# Patient Record
Sex: FEMALE | Race: BLACK OR AFRICAN AMERICAN | Employment: UNEMPLOYED | URBAN - METROPOLITAN AREA
[De-identification: names, ages, dates, MRNs, and addresses within clinical notes are randomized per-mention and may not be internally consistent; named-entity substitution may affect disease eponyms.]

---

## 2017-01-12 ENCOUNTER — ED HISTORICAL/CONVERTED ENCOUNTER (OUTPATIENT)
Dept: OTHER | Age: 22
End: 2017-01-12

## 2017-01-13 ENCOUNTER — OP HISTORICAL/CONVERTED ENCOUNTER (OUTPATIENT)
Dept: OTHER | Age: 22
End: 2017-01-13

## 2017-03-17 ENCOUNTER — ED HISTORICAL/CONVERTED ENCOUNTER (OUTPATIENT)
Dept: OTHER | Age: 22
End: 2017-03-17

## 2017-05-19 ENCOUNTER — ED HISTORICAL/CONVERTED ENCOUNTER (OUTPATIENT)
Dept: OTHER | Age: 22
End: 2017-05-19

## 2017-05-23 ENCOUNTER — ED HISTORICAL/CONVERTED ENCOUNTER (OUTPATIENT)
Dept: OTHER | Age: 22
End: 2017-05-23

## 2018-12-05 ENCOUNTER — ED HISTORICAL/CONVERTED ENCOUNTER (OUTPATIENT)
Dept: OTHER | Age: 23
End: 2018-12-05

## 2019-05-13 ENCOUNTER — ED HISTORICAL/CONVERTED ENCOUNTER (OUTPATIENT)
Dept: OTHER | Age: 24
End: 2019-05-13

## 2021-08-12 ENCOUNTER — HOSPITAL ENCOUNTER (EMERGENCY)
Age: 26
Discharge: HOME | End: 2021-08-12
Admitting: EMERGENCY MEDICINE
Payer: COMMERCIAL

## 2021-08-12 VITALS
WEIGHT: 238 LBS | OXYGEN SATURATION: 99 % | TEMPERATURE: 99.4 F | BODY MASS INDEX: 43.79 KG/M2 | SYSTOLIC BLOOD PRESSURE: 137 MMHG | DIASTOLIC BLOOD PRESSURE: 97 MMHG | RESPIRATION RATE: 18 BRPM | HEIGHT: 62 IN | HEART RATE: 91 BPM

## 2021-08-12 DIAGNOSIS — U07.1 COVID-19: Primary | ICD-10-CM

## 2021-08-12 DIAGNOSIS — R11.2 NAUSEA AND VOMITING, INTRACTABILITY OF VOMITING NOT SPECIFIED, UNSPECIFIED VOMITING TYPE: ICD-10-CM

## 2021-08-12 LAB
ALBUMIN SERPL-MCNC: 3.5 G/DL (ref 3.5–5)
ALBUMIN/GLOB SERPL: 0.7 {RATIO} (ref 1.1–2.2)
ALP SERPL-CCNC: 56 U/L (ref 45–117)
ALT SERPL-CCNC: 32 U/L (ref 12–78)
ANION GAP SERPL CALC-SCNC: 4 MMOL/L (ref 5–15)
APPEARANCE UR: ABNORMAL
AST SERPL W P-5'-P-CCNC: 42 U/L (ref 15–37)
BACTERIA URNS QL MICRO: NEGATIVE /HPF
BASOPHILS # BLD: 0 K/UL (ref 0–0.1)
BASOPHILS NFR BLD: 0 % (ref 0–1)
BILIRUB SERPL-MCNC: 0.5 MG/DL (ref 0.2–1)
BILIRUB UR QL: NEGATIVE
BUN SERPL-MCNC: 9 MG/DL (ref 6–20)
BUN/CREAT SERPL: 12 (ref 12–20)
CA-I BLD-MCNC: 8.3 MG/DL (ref 8.5–10.1)
CHLORIDE SERPL-SCNC: 102 MMOL/L (ref 97–108)
CO2 SERPL-SCNC: 25 MMOL/L (ref 21–32)
COLOR UR: YELLOW
CREAT SERPL-MCNC: 0.75 MG/DL (ref 0.55–1.02)
DIFFERENTIAL METHOD BLD: ABNORMAL
EOSINOPHIL # BLD: 0 K/UL (ref 0–0.4)
EOSINOPHIL NFR BLD: 0 % (ref 0–7)
ERYTHROCYTE [DISTWIDTH] IN BLOOD BY AUTOMATED COUNT: 13.6 % (ref 11.5–14.5)
GLOBULIN SER CALC-MCNC: 5 G/DL (ref 2–4)
GLUCOSE SERPL-MCNC: 115 MG/DL (ref 65–100)
GLUCOSE UR STRIP.AUTO-MCNC: NEGATIVE MG/DL
HCT VFR BLD AUTO: 46.8 % (ref 35–47)
HGB BLD-MCNC: 15.4 G/DL (ref 11.5–16)
HGB UR QL STRIP: NEGATIVE
IMM GRANULOCYTES # BLD AUTO: 0 K/UL
IMM GRANULOCYTES NFR BLD AUTO: 0 %
KETONES UR QL STRIP.AUTO: 20 MG/DL
LEUKOCYTE ESTERASE UR QL STRIP.AUTO: NEGATIVE
LYMPHOCYTES # BLD: 2 K/UL (ref 0.8–3.5)
LYMPHOCYTES NFR BLD: 56 % (ref 12–49)
MCH RBC QN AUTO: 28.5 PG (ref 26–34)
MCHC RBC AUTO-ENTMCNC: 32.9 G/DL (ref 30–36.5)
MCV RBC AUTO: 86.7 FL (ref 80–99)
MONOCYTES # BLD: 0.6 K/UL (ref 0–1)
MONOCYTES NFR BLD: 16 % (ref 5–13)
MUCOUS THREADS URNS QL MICRO: ABNORMAL /LPF
NEUTS SEG # BLD: 1 K/UL (ref 1.8–8)
NEUTS SEG NFR BLD: 28 % (ref 32–75)
NITRITE UR QL STRIP.AUTO: NEGATIVE
NRBC # BLD: 0 K/UL (ref 0–0.01)
NRBC BLD-RTO: 0 PER 100 WBC
PH UR STRIP: 5 [PH] (ref 5–8)
PLATELET # BLD AUTO: 95 K/UL (ref 150–400)
POTASSIUM SERPL-SCNC: 5 MMOL/L (ref 3.5–5.1)
PROT SERPL-MCNC: 8.5 G/DL (ref 6.4–8.2)
PROT UR STRIP-MCNC: >300 MG/DL
RBC # BLD AUTO: 5.4 M/UL (ref 3.8–5.2)
RBC #/AREA URNS HPF: ABNORMAL /HPF (ref 0–5)
RBC MORPH BLD: ABNORMAL
SODIUM SERPL-SCNC: 131 MMOL/L (ref 136–145)
SP GR UR REFRACTOMETRY: 1.03 (ref 1–1.03)
UA: UC IF INDICATED,UAUC: ABNORMAL
UROBILINOGEN UR QL STRIP.AUTO: 0.1 EU/DL (ref 0.1–1)
WBC # BLD AUTO: 3.6 K/UL (ref 3.6–11)
WBC URNS QL MICRO: ABNORMAL /HPF (ref 0–4)

## 2021-08-12 PROCEDURE — 80053 COMPREHEN METABOLIC PANEL: CPT

## 2021-08-12 PROCEDURE — 36415 COLL VENOUS BLD VENIPUNCTURE: CPT

## 2021-08-12 PROCEDURE — 96374 THER/PROPH/DIAG INJ IV PUSH: CPT

## 2021-08-12 PROCEDURE — 99283 EMERGENCY DEPT VISIT LOW MDM: CPT

## 2021-08-12 PROCEDURE — 81001 URINALYSIS AUTO W/SCOPE: CPT

## 2021-08-12 PROCEDURE — 85025 COMPLETE CBC W/AUTO DIFF WBC: CPT

## 2021-08-12 PROCEDURE — 74011250636 HC RX REV CODE- 250/636: Performed by: NURSE PRACTITIONER

## 2021-08-12 RX ORDER — ONDANSETRON 2 MG/ML
4 INJECTION INTRAMUSCULAR; INTRAVENOUS
Status: COMPLETED | OUTPATIENT
Start: 2021-08-12 | End: 2021-08-12

## 2021-08-12 RX ORDER — FAMOTIDINE 20 MG/1
40 TABLET, FILM COATED ORAL
Qty: 20 TABLET | Refills: 0 | Status: SHIPPED | OUTPATIENT
Start: 2021-08-12 | End: 2021-08-22

## 2021-08-12 RX ORDER — ALBUTEROL SULFATE 90 UG/1
2 AEROSOL, METERED RESPIRATORY (INHALATION)
Qty: 1 INHALER | Refills: 0 | Status: SHIPPED | OUTPATIENT
Start: 2021-08-12 | End: 2022-02-17

## 2021-08-12 RX ORDER — ONDANSETRON 4 MG/1
4 TABLET, ORALLY DISINTEGRATING ORAL
Qty: 10 TABLET | Refills: 1 | Status: SHIPPED | OUTPATIENT
Start: 2021-08-12 | End: 2022-02-17 | Stop reason: SDUPTHER

## 2021-08-12 RX ORDER — PROMETHAZINE HYDROCHLORIDE AND DEXTROMETHORPHAN HYDROBROMIDE 6.25; 15 MG/5ML; MG/5ML
5 SYRUP ORAL
Qty: 120 ML | Refills: 0 | Status: SHIPPED | OUTPATIENT
Start: 2021-08-12 | End: 2021-08-19

## 2021-08-12 RX ORDER — BENZONATATE 100 MG/1
100 CAPSULE ORAL
Qty: 21 CAPSULE | Refills: 0 | Status: SHIPPED | OUTPATIENT
Start: 2021-08-12 | End: 2021-08-19

## 2021-08-12 RX ADMIN — SODIUM CHLORIDE 1000 ML: 9 INJECTION, SOLUTION INTRAVENOUS at 15:22

## 2021-08-12 RX ADMIN — ONDANSETRON 4 MG: 2 INJECTION INTRAMUSCULAR; INTRAVENOUS at 15:23

## 2021-08-12 NOTE — LETTER
[facility] EMERGENCY DEPT  [address] [facility] [place] [address]  [phone]    Work/School Note    Date: 8/12/2021     To Whom It May concern:    Vergara Spray was evaulated by the following provider(s):  Nurse Practitioner: Jeovanny Costello NP.   [address] virus is suspected. Per the CDC guidelines we recommend home isolation until the following conditions are all met:    1. At least 10 days have passed since symptoms first appeared and  2. At least 24 hours have passed since last fever without the use of fever-reducing medications and  3.  Symptoms (e.g., cough, shortness of breath) have improved    Sincerely,          Rossy Walker NP

## 2021-08-12 NOTE — ED PROVIDER NOTES
EMERGENCY DEPARTMENT HISTORY AND PHYSICAL EXAM      Date: 8/12/2021  Patient Name: Ursula Reyna    History of Presenting Illness     Chief Complaint   Patient presents with    Nausea    Vomiting    Epigastric Pain    Chills       History Provided By: Patient    HPI: Ursula Reyna, 30 y.o. female with a past medical history significant COVID-19. presents to the ED with cc of patient diagnosed with COVID-19 7 days ago. Patient states she still having symptoms. There are no other complaints, changes, or physical findings at this time. PCP: None    No current facility-administered medications on file prior to encounter. No current outpatient medications on file prior to encounter. Past History     Past Medical History:  History reviewed. No pertinent past medical history. Past Surgical History:  History reviewed. No pertinent surgical history. Family History:  History reviewed. No pertinent family history. Social History:  Social History     Tobacco Use    Smoking status: Never Smoker    Smokeless tobacco: Never Used   Substance Use Topics    Alcohol use: Not on file    Drug use: Not on file       Allergies:  No Known Allergies      Review of Systems     Review of Systems   Constitutional: Positive for appetite change, chills, fatigue and fever. HENT: Negative for congestion, sinus pressure and trouble swallowing. Eyes: Negative for photophobia and pain. Respiratory: Negative for cough and shortness of breath. Cardiovascular: Negative for chest pain and leg swelling. Gastrointestinal: Positive for nausea. Negative for abdominal pain, diarrhea and vomiting. Endocrine: Negative for polydipsia, polyphagia and polyuria. Genitourinary: Negative for decreased urine volume, difficulty urinating, dysuria, hematuria and urgency. Musculoskeletal: Negative for back pain, gait problem, myalgias and neck pain. Skin: Negative for pallor and rash. Allergic/Immunologic: Negative for environmental allergies and food allergies. Neurological: Negative for dizziness, facial asymmetry, speech difficulty, numbness and headaches. Hematological: Negative for adenopathy. Does not bruise/bleed easily. Psychiatric/Behavioral: Negative for agitation, self-injury and suicidal ideas. The patient is not nervous/anxious. Physical Exam     Physical Exam  Vitals and nursing note reviewed. Constitutional:       Appearance: Normal appearance. HENT:      Head: Atraumatic. Right Ear: Tympanic membrane and external ear normal.      Left Ear: Tympanic membrane and external ear normal.      Nose: Nose normal.      Mouth/Throat:      Mouth: Mucous membranes are dry. Eyes:      Extraocular Movements: Extraocular movements intact. Pupils: Pupils are equal, round, and reactive to light. Cardiovascular:      Rate and Rhythm: Normal rate and regular rhythm. Pulses: Normal pulses. Heart sounds: Normal heart sounds. Pulmonary:      Breath sounds: Normal breath sounds. Abdominal:      General: Abdomen is flat. Palpations: Abdomen is soft. Musculoskeletal:         General: Normal range of motion. Cervical back: Normal range of motion and neck supple. Skin:     General: Skin is warm and dry. Capillary Refill: Capillary refill takes less than 2 seconds. Neurological:      General: No focal deficit present. Mental Status: She is alert and oriented to person, place, and time. Mental status is at baseline. Psychiatric:         Mood and Affect: Mood normal.         Behavior: Behavior normal.         Lab and Diagnostic Study Results     Labs -     No results found for this or any previous visit (from the past 12 hour(s)). Radiologic Studies -   @lastxrresult@  CT Results  (Last 48 hours)    None        CXR Results  (Last 48 hours)    None            Medical Decision Making   - I am the first provider for this patient.     - I reviewed the vital signs, available nursing notes, past medical history, past surgical history, family history and social history. - Initial assessment performed. The patients presenting problems have been discussed, and they are in agreement with the care plan formulated and outlined with them. I have encouraged them to ask questions as they arise throughout their visit. Vital Signs-Reviewed the patient's vital signs. No data found. Records Reviewed: Nursing Notes and Old Medical Records          ED Course:          Provider Notes (Medical Decision Making):   Pt presents with acute URI symptoms including nasal congestion, rhinorrhea and sore throat. Pt also has c/o of cough without dyspnea, chest pain or wheezing. Pt is well-appearing with stable vitals and benign exam; symptoms are consistent with an uncomplicated URI. DDx: acute bronchitis, bacterial sinusitis vs. pharyngitis, migraine, flu, COVID-19 . Symptomatic therapy suggested: acetaminophen, ibuprofen, antihistamine-decongestant of choice, cough suppressant of choice. Increase fluids, use vaporizer, stay in steamy bathroom tid 15 min prn severe cough, tylenol as needed, rest, avoid smoky areas. Lack of antibiotic effectiveness discussed with her. Symptomatic therapy suggested: gargle for sore throat, use mist at bedside for congestion. Apply facial warm packs for sinus pain or use nasal saline sprays. Follow up prn if not better in 72 hours. MDM       Procedures   Medical Decision Makingedical Decision Making  Performed by: Venessa Gutierrez NP  PROCEDURES:  Procedures       Disposition   Disposition: DC- Adult Discharges: All of the diagnostic tests were reviewed and questions answered. Diagnosis, care plan and treatment options were discussed. The patient understands the instructions and will follow up as directed. The patients results have been reviewed with them. They have been counseled regarding their diagnosis.   The patient verbally convey understanding and agreement of the signs, symptoms, diagnosis, treatment and prognosis and additionally agrees to follow up as recommended with their PCP in 24 - 48 hours. They also agree with the care-plan and convey that all of their questions have been answered. I have also put together some discharge instructions for them that include: 1) educational information regarding their diagnosis, 2) how to care for their diagnosis at home, as well a 3) list of reasons why they would want to return to the ED prior to their follow-up appointment, should their condition change. Discharged    DISCHARGE PLAN:  1. There are no discharge medications for this patient. 2.   Follow-up Information     Follow up With Specialties Details Why Contact Info    Your PCP        [place] Karissa Chang  Schedule an appointment as soon as possible for a visit   [address] [place] [address]  [phone]        3. Return to ED if worse   4. Discharge Medication List as of 8/12/2021  5:21 PM      START taking these medications    Details   famotidine (Pepcid) 20 mg tablet Take 2 Tablets by mouth nightly for 10 days. , Normal, Disp-20 Tablet, R-0      promethazine-dextromethorphan (PROMETHAZINE-DM) 6.25-15 mg/5 mL syrup Take 5 mL by mouth every four (4) hours as needed for Cough for up to 7 days. , Normal, Disp-120 mL, R-0      ondansetron (Zofran ODT) 4 mg disintegrating tablet Take 1 Tablet by mouth every eight (8) hours as needed for Nausea or Vomiting., Normal, Disp-10 Tablet, R-1      albuterol (Ventolin HFA) 90 mcg/actuation inhaler Take 2 Puffs by inhalation every four (4) hours as needed for Wheezing., Normal, Disp-1 Inhaler, R-0      benzonatate (Tessalon Perles) 100 mg capsule Take 1 Capsule by mouth three (3) times daily as needed for Cough for up to 7 days. , Normal, Disp-21 Capsule, R-0               Diagnosis     Clinical Impression:   1. COVID-19    2.  Nausea and vomiting, intractability of vomiting not specified, unspecified vomiting type        Attestations:    Valery Hernandez NP    Please note that this dictation was completed with [employer], the computer voice recognition software. Quite often unanticipated grammatical, syntax, homophones, and other interpretive errors are inadvertently transcribed by the computer software. Please disregard these errors. Please excuse any errors that have escaped final proofreading. Thank you.

## 2021-08-13 ENCOUNTER — PATIENT OUTREACH (OUTPATIENT)
Dept: CASE MANAGEMENT | Age: 26
End: 2021-08-13

## 2021-08-13 NOTE — PROGRESS NOTES
Patient contacted regarding COVID-19 diagnosis. Discussed COVID-19 related testing which was not done at this time. Test results were not done. Patient informed of results, if available? Yes. Pt had a positive COVID-19 test at outside facility on or about 21 prior to this ED visit. Ambulatory Care Manager contacted the patient by telephone to perform post discharge assessment. Call within 2 business days of discharge: Yes Verified name and  with patient as identifiers. Provided introduction to self, and explanation of the CTN/ACM role, and reason for call due to risk factors for infection and/or exposure to COVID-19. Symptoms reviewed with patient who verbalized the following symptoms: no new symptoms and no worsening symptoms      Due to no new or worsening symptoms encounter was not routed to provider for escalation. Discussed follow-up appointments. If no appointment was previously scheduled, appointment scheduling offered:  yes. [facility] follow up appointment(s): No future appointments. Non-[facility] follow up appointment(s): pt states doing better today. Pt states no shortness of breath, chest pain or difficulty breathing noted. Pt states no fever, chill or cough. Patient states she was unable to  her prescribed medications last night, but will pick them up, if needed. Patient states she will follow up with her primary care as needed. Interventions to address risk factors: Obtained and reviewed discharge summary and/or continuity of care documents     Advance Care Planning:   Does patient have an Advance Directive: patient declined education. Educated patient about risk for severe COVID-19 due to risk factors according to CDC guidelines. ACM reviewed discharge instructions, medical action plan and red flag symptoms with the patient who verbalized understanding. Discussed COVID vaccination status: yes. Education provided on COVID-19 vaccination as appropriate.  Discussed exposure protocols and quarantine with CDC Guidelines. Patient was given an opportunity to verbalize any questions and concerns and agrees to contact ACM or health care provider for questions related to their healthcare. Reviewed and educated patient on any new and changed medications related to discharge diagnosis     Was patient discharged with a pulse oximeter? no Discussed and confirmed pulse oximeter discharge instructions and when to notify provider or seek emergency care. ACM provided contact information. Plan for follow up call in 14 days based on severity of symptoms and risk factors.

## 2021-08-27 ENCOUNTER — PATIENT OUTREACH (OUTPATIENT)
Dept: CASE MANAGEMENT | Age: 26
End: 2021-08-27

## 2021-08-27 NOTE — PROGRESS NOTES
Patient resolved from Transition of Care episode on 8/27/21. ACM/CTN was unsuccessful at contacting this patient today. Patient/family was provided the following resources and education related to COVID-19 during the initial call:                         Signs, symptoms and red flags related to COVID-19            CDC exposure and quarantine guidelines            [facility] exposure contact - [phone]            Contact for their local Department of Health               Patient has not had any additional ED or hospital visits. No further outreach scheduled with this CTN/ACM. Episode of Care resolved. Patient has this CTN/ACM contact information if future needs arise.

## 2021-08-31 PROBLEM — U07.1 COVID-19: Status: ACTIVE | Noted: 2021-08-31

## 2022-01-26 ENCOUNTER — APPOINTMENT (OUTPATIENT)
Dept: ULTRASOUND IMAGING | Age: 27
End: 2022-01-26
Attending: STUDENT IN AN ORGANIZED HEALTH CARE EDUCATION/TRAINING PROGRAM
Payer: MEDICAID

## 2022-01-26 ENCOUNTER — HOSPITAL ENCOUNTER (EMERGENCY)
Age: 27
Discharge: HOME | End: 2022-01-26
Attending: STUDENT IN AN ORGANIZED HEALTH CARE EDUCATION/TRAINING PROGRAM
Payer: MEDICAID

## 2022-01-26 VITALS
BODY MASS INDEX: 40.48 KG/M2 | RESPIRATION RATE: 18 BRPM | SYSTOLIC BLOOD PRESSURE: 119 MMHG | TEMPERATURE: 99 F | HEIGHT: 62 IN | WEIGHT: 220 LBS | OXYGEN SATURATION: 100 % | DIASTOLIC BLOOD PRESSURE: 73 MMHG | HEART RATE: 83 BPM

## 2022-01-26 DIAGNOSIS — R11.10 HYPEREMESIS: Primary | ICD-10-CM

## 2022-01-26 LAB
ALBUMIN SERPL-MCNC: 3.5 G/DL (ref 3.5–5)
ALBUMIN/GLOB SERPL: 0.8 {RATIO} (ref 1.1–2.2)
ALP SERPL-CCNC: 59 U/L (ref 45–117)
ALT SERPL-CCNC: 16 U/L (ref 12–78)
ANION GAP SERPL CALC-SCNC: 7 MMOL/L (ref 5–15)
APPEARANCE UR: ABNORMAL
AST SERPL W P-5'-P-CCNC: 24 U/L (ref 15–37)
BACTERIA URNS QL MICRO: NEGATIVE /HPF
BASOPHILS # BLD: 0 K/UL (ref 0–0.1)
BASOPHILS NFR BLD: 0 % (ref 0–1)
BILIRUB SERPL-MCNC: 0.6 MG/DL (ref 0.2–1)
BILIRUB UR QL: NEGATIVE
BUN SERPL-MCNC: 4 MG/DL (ref 6–20)
BUN/CREAT SERPL: 5 (ref 12–20)
CA-I BLD-MCNC: 9.5 MG/DL (ref 8.5–10.1)
CHLORIDE SERPL-SCNC: 106 MMOL/L (ref 97–108)
CO2 SERPL-SCNC: 23 MMOL/L (ref 21–32)
COLOR UR: ABNORMAL
CREAT SERPL-MCNC: 0.74 MG/DL (ref 0.55–1.02)
DIFFERENTIAL METHOD BLD: ABNORMAL
EOSINOPHIL # BLD: 0.1 K/UL (ref 0–0.4)
EOSINOPHIL NFR BLD: 1 % (ref 0–7)
ERYTHROCYTE [DISTWIDTH] IN BLOOD BY AUTOMATED COUNT: 13.9 % (ref 11.5–14.5)
GLOBULIN SER CALC-MCNC: 4.6 G/DL (ref 2–4)
GLUCOSE SERPL-MCNC: 77 MG/DL (ref 65–100)
GLUCOSE UR STRIP.AUTO-MCNC: NEGATIVE MG/DL
HCG SERPL-ACNC: ABNORMAL MIU/ML (ref 0–6)
HCT VFR BLD AUTO: 43.1 % (ref 35–47)
HGB BLD-MCNC: 13.9 G/DL (ref 11.5–16)
HGB UR QL STRIP: NEGATIVE
IMM GRANULOCYTES # BLD AUTO: 0 K/UL (ref 0–0.04)
IMM GRANULOCYTES NFR BLD AUTO: 1 % (ref 0–0.5)
KETONES UR QL STRIP.AUTO: 80 MG/DL
LEUKOCYTE ESTERASE UR QL STRIP.AUTO: NEGATIVE
LYMPHOCYTES # BLD: 2.1 K/UL (ref 0.8–3.5)
LYMPHOCYTES NFR BLD: 27 % (ref 12–49)
MCH RBC QN AUTO: 28.5 PG (ref 26–34)
MCHC RBC AUTO-ENTMCNC: 32.3 G/DL (ref 30–36.5)
MCV RBC AUTO: 88.3 FL (ref 80–99)
MONOCYTES # BLD: 0.7 K/UL (ref 0–1)
MONOCYTES NFR BLD: 9 % (ref 5–13)
MUCOUS THREADS URNS QL MICRO: ABNORMAL /LPF
NEUTS SEG # BLD: 5 K/UL (ref 1.8–8)
NEUTS SEG NFR BLD: 62 % (ref 32–75)
NITRITE UR QL STRIP.AUTO: NEGATIVE
NRBC # BLD: 0 K/UL (ref 0–0.01)
NRBC BLD-RTO: 0 PER 100 WBC
PH UR STRIP: 6 [PH] (ref 5–8)
PLATELET # BLD AUTO: 219 K/UL (ref 150–400)
POTASSIUM SERPL-SCNC: 4.3 MMOL/L (ref 3.5–5.1)
PROT SERPL-MCNC: 8.1 G/DL (ref 6.4–8.2)
PROT UR STRIP-MCNC: NEGATIVE MG/DL
RBC # BLD AUTO: 4.88 M/UL (ref 3.8–5.2)
RBC #/AREA URNS HPF: ABNORMAL /HPF (ref 0–5)
SODIUM SERPL-SCNC: 136 MMOL/L (ref 136–145)
SP GR UR REFRACTOMETRY: 1.02 (ref 1–1.03)
UA: UC IF INDICATED,UAUC: ABNORMAL
UROBILINOGEN UR QL STRIP.AUTO: 0.1 EU/DL (ref 0.1–1)
WBC # BLD AUTO: 8 K/UL (ref 3.6–11)
WBC URNS QL MICRO: ABNORMAL /HPF (ref 0–4)

## 2022-01-26 PROCEDURE — 76830 TRANSVAGINAL US NON-OB: CPT

## 2022-01-26 PROCEDURE — 74011250636 HC RX REV CODE- 250/636

## 2022-01-26 PROCEDURE — 96361 HYDRATE IV INFUSION ADD-ON: CPT

## 2022-01-26 PROCEDURE — 99284 EMERGENCY DEPT VISIT MOD MDM: CPT

## 2022-01-26 PROCEDURE — 96375 TX/PRO/DX INJ NEW DRUG ADDON: CPT

## 2022-01-26 PROCEDURE — 84702 CHORIONIC GONADOTROPIN TEST: CPT

## 2022-01-26 PROCEDURE — 96365 THER/PROPH/DIAG IV INF INIT: CPT

## 2022-01-26 PROCEDURE — 80053 COMPREHEN METABOLIC PANEL: CPT

## 2022-01-26 PROCEDURE — 36415 COLL VENOUS BLD VENIPUNCTURE: CPT

## 2022-01-26 PROCEDURE — 74011000258 HC RX REV CODE- 258: Performed by: STUDENT IN AN ORGANIZED HEALTH CARE EDUCATION/TRAINING PROGRAM

## 2022-01-26 PROCEDURE — 85025 COMPLETE CBC W/AUTO DIFF WBC: CPT

## 2022-01-26 PROCEDURE — 74011250636 HC RX REV CODE- 250/636: Performed by: STUDENT IN AN ORGANIZED HEALTH CARE EDUCATION/TRAINING PROGRAM

## 2022-01-26 PROCEDURE — 81001 URINALYSIS AUTO W/SCOPE: CPT

## 2022-01-26 RX ORDER — ONDANSETRON 2 MG/ML
4 INJECTION INTRAMUSCULAR; INTRAVENOUS
Status: COMPLETED | OUTPATIENT
Start: 2022-01-26 | End: 2022-01-26

## 2022-01-26 RX ORDER — DOXYLAMINE SUCCINATE AND PYRIDOXINE HYDROCHLORIDE, DELAYED RELEASE TABLETS 10 MG/10 MG 10; 10 MG/1; MG/1
1 TABLET, DELAYED RELEASE ORAL
Qty: 15 TABLET | Refills: 0 | Status: SHIPPED | OUTPATIENT
Start: 2022-01-26 | End: 2022-02-17

## 2022-01-26 RX ORDER — PYRIDOXINE HYDROCHLORIDE 100 MG/ML
100 INJECTION, SOLUTION INTRAMUSCULAR; INTRAVENOUS ONCE
Status: DISCONTINUED | OUTPATIENT
Start: 2022-01-26 | End: 2022-01-26

## 2022-01-26 RX ORDER — PYRIDOXINE HYDROCHLORIDE 100 MG/ML
10 INJECTION, SOLUTION INTRAMUSCULAR; INTRAVENOUS ONCE
Status: COMPLETED | OUTPATIENT
Start: 2022-01-26 | End: 2022-01-26

## 2022-01-26 RX ORDER — ONDANSETRON 2 MG/ML
INJECTION INTRAMUSCULAR; INTRAVENOUS
Status: DISCONTINUED
Start: 2022-01-26 | End: 2022-01-26 | Stop reason: WASHOUT

## 2022-01-26 RX ADMIN — ONDANSETRON 4 MG: 2 INJECTION INTRAMUSCULAR; INTRAVENOUS at 12:59

## 2022-01-26 RX ADMIN — PYRIDOXINE HYDROCHLORIDE 10 MG: 100 INJECTION, SOLUTION INTRAMUSCULAR; INTRAVENOUS at 14:47

## 2022-01-26 RX ADMIN — SODIUM CHLORIDE, POTASSIUM CHLORIDE, SODIUM LACTATE AND CALCIUM CHLORIDE 1000 ML: 600; 310; 30; 20 INJECTION, SOLUTION INTRAVENOUS at 13:45

## 2022-01-26 RX ADMIN — PROMETHAZINE HYDROCHLORIDE 12.5 MG: 25 INJECTION INTRAMUSCULAR; INTRAVENOUS at 14:10

## 2022-01-26 NOTE — ED PROVIDER NOTES
EMERGENCY DEPARTMENT HISTORY AND PHYSICAL EXAM      Date: 1/26/2022  Patient Name: Andrew Lux    History of Presenting Illness     Chief Complaint   Patient presents with    Vomiting       History Provided By: Patient    HPI: Andrew Lux, 27 y.o. female with a past medical history significant No significant past medical history, G2, P1 proximately 6 weeks pregnant by date presents to the ED with cc of abdominal pain, nausea vomiting. Patient states over the last 2 weeks has had increasing intermittent episodes of vomiting with abdominal pain. Describes as aching diffuse throughout abdomen. Denies any lower abdominal cramping, denies any vaginal bleeding, denies any pain or burning on urination. States she cannot tolerate any p.o. food smells make her nauseous. There are no other complaints, changes, or physical findings at this time. PCP: None    Current Outpatient Medications   Medication Sig Dispense Refill    doxylamine-pyridoxine, vit B6, (DICLEGIS) 10-10 mg TbEC DR tablet Take 1 Tablet by mouth nightly. 15 Tablet 0    ondansetron (Zofran ODT) 4 mg disintegrating tablet Take 1 Tablet by mouth every eight (8) hours as needed for Nausea or Vomiting. 10 Tablet 1    albuterol (Ventolin HFA) 90 mcg/actuation inhaler Take 2 Puffs by inhalation every four (4) hours as needed for Wheezing. 1 Inhaler 0       Past History     Past Medical History:  History reviewed. No pertinent past medical history. Past Surgical History:  History reviewed. No pertinent surgical history. Family History:  History reviewed. No pertinent family history. Social History:  Social History     Tobacco Use    Smoking status: Never Smoker    Smokeless tobacco: Never Used   Substance Use Topics    Alcohol use: Not Currently    Drug use: Not Currently       Allergies:  No Known Allergies      Review of Systems     Review of Systems   Constitutional: Positive for appetite change.  Negative for activity change, chills, fatigue and fever. Respiratory: Negative for chest tightness and shortness of breath. Cardiovascular: Negative for chest pain and palpitations. Gastrointestinal: Positive for abdominal pain, nausea and vomiting. Genitourinary: Negative for dysuria, hematuria, pelvic pain and vaginal bleeding. Musculoskeletal: Negative for arthralgias and back pain. Skin: Negative for color change. Neurological: Negative for dizziness, numbness and headaches. Physical Exam     Physical Exam  Constitutional:       General: She is not in acute distress. Appearance: She is well-developed and normal weight. She is not ill-appearing, toxic-appearing or diaphoretic. HENT:      Head: Normocephalic and atraumatic. Mouth/Throat:      Mouth: Mucous membranes are moist.   Eyes:      Extraocular Movements: Extraocular movements intact. Cardiovascular:      Rate and Rhythm: Normal rate and regular rhythm. Heart sounds: Normal heart sounds. Pulmonary:      Effort: Pulmonary effort is normal.      Breath sounds: Normal breath sounds. Abdominal:      General: Abdomen is flat. Bowel sounds are normal. There is no distension. Palpations: Abdomen is soft. Tenderness: There is no abdominal tenderness. There is no guarding. Skin:     General: Skin is warm and dry. Capillary Refill: Capillary refill takes less than 2 seconds. Coloration: Skin is not jaundiced. Neurological:      General: No focal deficit present. Mental Status: She is alert and oriented to person, place, and time.    Psychiatric:         Mood and Affect: Mood normal.         Behavior: Behavior normal.         Diagnostic Study Results     Labs -     Recent Results (from the past 12 hour(s))   CBC WITH AUTOMATED DIFF    Collection Time: 01/26/22 12:47 PM   Result Value Ref Range    WBC 8.0 3.6 - 11.0 K/uL    RBC 4.88 3.80 - 5.20 M/uL    HGB 13.9 11.5 - 16.0 g/dL    HCT 43.1 35.0 - 47.0 %    MCV 88.3 80.0 - 99.0 FL    MCH 28.5 26.0 - 34.0 PG    MCHC 32.3 30.0 - 36.5 g/dL    RDW 13.9 11.5 - 14.5 %    PLATELET 112 504 - 524 K/uL    NRBC 0.0 0.0  WBC    ABSOLUTE NRBC 0.00 0.00 - 0.01 K/uL    NEUTROPHILS 62 32 - 75 %    LYMPHOCYTES 27 12 - 49 %    MONOCYTES 9 5 - 13 %    EOSINOPHILS 1 0 - 7 %    BASOPHILS 0 0 - 1 %    IMMATURE GRANULOCYTES 1 (H) 0 - 0.5 %    ABS. NEUTROPHILS 5.0 1.8 - 8.0 K/UL    ABS. LYMPHOCYTES 2.1 0.8 - 3.5 K/UL    ABS. MONOCYTES 0.7 0.0 - 1.0 K/UL    ABS. EOSINOPHILS 0.1 0.0 - 0.4 K/UL    ABS. BASOPHILS 0.0 0.0 - 0.1 K/UL    ABS. IMM. GRANS. 0.0 0.00 - 0.04 K/UL    DF AUTOMATED     METABOLIC PANEL, COMPREHENSIVE    Collection Time: 01/26/22 12:47 PM   Result Value Ref Range    Sodium 136 136 - 145 mmol/L    Potassium 4.3 3.5 - 5.1 mmol/L    Chloride 106 97 - 108 mmol/L    CO2 23 21 - 32 mmol/L    Anion gap 7 5 - 15 mmol/L    Glucose 77 65 - 100 mg/dL    BUN 4 (L) 6 - 20 mg/dL    Creatinine 0.74 0.55 - 1.02 mg/dL    BUN/Creatinine ratio 5 (L) 12 - 20      GFR est AA >60 >60 ml/min/1.73m2    GFR est non-AA >60 >60 ml/min/1.73m2    Calcium 9.5 8.5 - 10.1 mg/dL    Bilirubin, total 0.6 0.2 - 1.0 mg/dL    AST (SGOT) 24 15 - 37 U/L    ALT (SGPT) 16 12 - 78 U/L    Alk.  phosphatase 59 45 - 117 U/L    Protein, total 8.1 6.4 - 8.2 g/dL    Albumin 3.5 3.5 - 5.0 g/dL    Globulin 4.6 (H) 2.0 - 4.0 g/dL    A-G Ratio 0.8 (L) 1.1 - 2.2     BETA HCG, QT    Collection Time: 01/26/22 12:47 PM   Result Value Ref Range    Beta HCG, QT 31,900.0 (H) 0 - 6 mIU/mL   URINALYSIS W/ REFLEX CULTURE    Collection Time: 01/26/22  3:28 PM    Specimen: Urine   Result Value Ref Range    Color Yellow/Straw      Appearance Turbid (A) Clear      Specific gravity 1.016 1.003 - 1.030      pH (UA) 6.0 5.0 - 8.0      Protein Negative Negative mg/dL    Glucose Negative Negative mg/dL    Ketone 80 (A) Negative mg/dL    Bilirubin Negative Negative      Blood Negative Negative      Urobilinogen 0.1 0.1 - 1.0 EU/dL    Nitrites Negative Negative      Leukocyte Esterase Negative Negative      UA:UC IF INDICATED Culture not indicated by UA result Culture not indicated by UA result      WBC 0-4 0 - 4 /hpf    RBC 0-5 0 - 5 /hpf    Bacteria Negative Negative /hpf    Mucus Trace /lpf       Radiologic Studies -   [unfilled]  CT Results  (Last 48 hours)    None        CXR Results  (Last 48 hours)    None          Medical Decision Making and ED Course   I am the first provider for this patient. I reviewed the vital signs, available nursing notes, past medical history, past surgical history, family history and social history. Vital Signs-Reviewed the patient's vital signs. Patient Vitals for the past 12 hrs:   Temp Pulse Resp BP SpO2   01/26/22 1715  83 18 119/73 100 %   01/26/22 1235 99 °F (37.2 °C) 73 18 129/70 100 %         Records Reviewed: Nursing Notes    The patient presents with abdominal pain nausea with a differential diagnosis of hyperemesis gravidarum, UTI, threatened AB      Provider Notes (Medical Decision Making):     MDM   68-year-old female, no significant past medical history, 6 weeks pregnant, presents with nausea vomiting diffuse abdominal pain    Physical shows uncomfortable female however in no distress, stable vitals, abdomen soft tender over epigastrium however no lower pelvic tenderness no guarding. Patient has no confirmed IUP, will obtain pelvic ultrasound to evaluate and rule out ectopic. Draw basic lab work, hydrate patient with lactated Ringer's administer pyridoxine, Phenergan, continue to observe patient. Patient reports improvement of symptoms after medication, ultrasound shows intrauterine pregnancy at 6 weeks, subchorionic hemorrhage noted as well. Lab work shows no signs of UTI mild ketones in urine otherwise normal LFTs, normal renal function. Patient discharged home with prescription for Diclegis. ED Course:   Initial assessment performed.  The patients presenting problems have been discussed, and they are in agreement with the care plan formulated and outlined with them. I have encouraged them to ask questions as they arise throughout their visit. Procedures       Luisito Sanz MD  Procedures               Disposition       Discharged    Remove if not discharged  DISCHARGE PLAN:  1. Current Discharge Medication List      CONTINUE these medications which have NOT CHANGED    Details   ondansetron (Zofran ODT) 4 mg disintegrating tablet Take 1 Tablet by mouth every eight (8) hours as needed for Nausea or Vomiting. Qty: 10 Tablet, Refills: 1      albuterol (Ventolin HFA) 90 mcg/actuation inhaler Take 2 Puffs by inhalation every four (4) hours as needed for Wheezing. Qty: 1 Inhaler, Refills: 0           2. Follow-up Information     Follow up With Specialties Details Why Contact Info    Your primary care OB  In 1 week          3. Return to ED if worse     Diagnosis     Clinical Impression:   1. Hyperemesis        Attestations:    Luisito Sanz MD    Please note that this dictation was completed with [employer], the computer voice recognition software. Quite often unanticipated grammatical, syntax, homophones, and other interpretive errors are inadvertently transcribed by the computer software. Please disregard these errors. Please excuse any errors that have escaped final proofreading. Thank you.

## 2022-01-26 NOTE — LETTER
[facility] EMERGENCY DEPT  [facility] [place] [address]  [phone]    Work/School Note    Date: 1/26/2022    To Whom It May concern:    Argelia Frias was seen and treated today in the emergency room by the following provider(s):  Attending Provider: Escobar Galindo MD. Pt was brought in by family member. Argelia Frias may return to work on Thursday 1/27/2022.     Sincerely,          Darrel Vann

## 2022-01-27 ENCOUNTER — PATIENT OUTREACH (OUTPATIENT)
Dept: CASE MANAGEMENT | Age: 27
End: 2022-01-27

## 2022-02-17 ENCOUNTER — INITIAL PRENATAL (OUTPATIENT)
Dept: OBGYN CLINIC | Age: 27
End: 2022-02-17
Payer: MEDICAID

## 2022-02-17 VITALS
OXYGEN SATURATION: 95 % | HEART RATE: 98 BPM | BODY MASS INDEX: 43.43 KG/M2 | SYSTOLIC BLOOD PRESSURE: 126 MMHG | WEIGHT: 236 LBS | DIASTOLIC BLOOD PRESSURE: 85 MMHG | HEIGHT: 62 IN

## 2022-02-17 DIAGNOSIS — Z34.90 PRENATAL CARE, ANTEPARTUM: ICD-10-CM

## 2022-02-17 DIAGNOSIS — O21.9 NAUSEA AND VOMITING DURING PREGNANCY: Primary | ICD-10-CM

## 2022-02-17 DIAGNOSIS — O36.80X0 PREGNANCY WITH UNCERTAIN FETAL VIABILITY, SINGLE OR UNSPECIFIED FETUS: ICD-10-CM

## 2022-02-17 DIAGNOSIS — Z01.419 GYNECOLOGIC EXAM NORMAL: ICD-10-CM

## 2022-02-17 DIAGNOSIS — Z34.90 PRENATAL CARE, ANTEPARTUM: Primary | ICD-10-CM

## 2022-02-17 DIAGNOSIS — Z3A.09 9 WEEKS GESTATION OF PREGNANCY: ICD-10-CM

## 2022-02-17 PROCEDURE — 99203 OFFICE O/P NEW LOW 30 MIN: CPT | Performed by: OBSTETRICS & GYNECOLOGY

## 2022-02-17 PROCEDURE — 76801 OB US < 14 WKS SINGLE FETUS: CPT | Performed by: OBSTETRICS & GYNECOLOGY

## 2022-02-17 RX ORDER — LANOLIN ALCOHOL/MO/W.PET/CERES
50 CREAM (GRAM) TOPICAL DAILY
Qty: 90 TABLET | Refills: 0 | Status: SHIPPED | OUTPATIENT
Start: 2022-02-17

## 2022-02-17 RX ORDER — METOCLOPRAMIDE 10 MG/1
10 TABLET ORAL
Qty: 120 TABLET | Refills: 3 | Status: SHIPPED | OUTPATIENT
Start: 2022-02-17 | End: 2022-02-17 | Stop reason: SDUPTHER

## 2022-02-17 RX ORDER — METOCLOPRAMIDE 10 MG/1
10 TABLET ORAL
Qty: 120 TABLET | Refills: 3 | Status: SHIPPED | OUTPATIENT
Start: 2022-02-17 | End: 2022-03-19

## 2022-02-17 RX ORDER — LANOLIN ALCOHOL/MO/W.PET/CERES
50 CREAM (GRAM) TOPICAL DAILY
Qty: 90 TABLET | Refills: 0 | Status: SHIPPED | OUTPATIENT
Start: 2022-02-17 | End: 2022-02-17 | Stop reason: SDUPTHER

## 2022-02-17 RX ORDER — ONDANSETRON 4 MG/1
4 TABLET, ORALLY DISINTEGRATING ORAL
Qty: 10 TABLET | Refills: 1 | Status: SHIPPED | OUTPATIENT
Start: 2022-02-17 | End: 2022-06-22 | Stop reason: ALTCHOICE

## 2022-02-17 NOTE — PATIENT INSTRUCTIONS
Nutrition During Pregnancy: Care Instructions  Overview     Healthy eating when you are pregnant is important for you and your baby. It can help you feel well and have a successful pregnancy and delivery. During pregnancy your nutrition needs increase. Even if you have excellent eating habits, your doctor may recommend a multivitamin to make sure you get enough iron and folic acid. You may wonder how much weight you should gain. In general, if you were at a healthy weight before you became pregnant, then you should gain between 25 and 35 pounds. If you were overweight before pregnancy, then you'll likely be advised to gain 15 to 25 pounds. If you were underweight before pregnancy, then you'll probably be advised to gain 28 to 40 pounds. Your doctor will work with you to set a weight goal that is right for you. Gaining a healthy amount of weight helps you have a healthy baby. Follow-up care is a key part of your treatment and safety. Be sure to make and go to all appointments, and call your doctor if you are having problems. It's also a good idea to know your test results and keep a list of the medicines you take. How can you care for yourself at home? · Eat plenty of fruits and vegetables. Include a variety of orange, yellow, and leafy dark-green vegetables every day. · Choose whole-grain bread, cereal, and pasta. Good choices include whole wheat bread, whole wheat pasta, brown rice, and oatmeal.  · Get 4 or more servings of milk and milk products each day. Good choices include nonfat or low-fat milk, yogurt, and cheese. If you cannot eat milk products, you can get calcium from calcium-fortified products such as orange juice, soy milk, and tofu. Other non-milk sources of calcium include leafy green vegetables, such as broccoli, kale, mustard greens, turnip greens, bok mady, and brussels sprouts. · If you eat meat, pick lower-fat types.  Good choices include lean cuts of meat and chicken or turkey without the skin. · Do not eat shark, swordfish, arthur mackerel, or tilefish. They have high levels of mercury, which is dangerous to your baby. You can eat up to 12 ounces a week of fish or shellfish that have low mercury levels. Good choices include shrimp, wild salmon, pollock, and catfish. Limit some other types of fish, such as white (albacore) tuna, to 4 oz (0.1 kg) a week. · Heat lunch meats (such as turkey, ham, or bologna) to 165°F before you eat them. This reduces your risk of getting sick from a kind of bacteria that can be found in lunch meats. · Do not eat unpasteurized soft cheeses, such as brie, feta, fresh mozzarella, and blue cheese. They have a bacteria that could harm your baby. · Limit caffeine. If you drink coffee or tea, have no more than 1 cup a day. Caffeine is also found in kim. · Do not drink any alcohol. No amount of alcohol has been found to be safe during pregnancy. · Do not diet or try to lose weight. For example, do not follow a low-carbohydrate diet. If you are overweight at the start of your pregnancy, your doctor will work with you to manage your weight gain. · Tell your doctor about all vitamins and supplements you take. When should you call for help? Watch closely for changes in your health, and be sure to contact your doctor if you have any problems. Where can you learn more? Go to http://www.gray.com/  Enter Y785 in the search box to learn more about \"Nutrition During Pregnancy: Care Instructions. \"  Current as of: December 17, 2020               Content Version: 13.0  © 7024-7125 [employer]. Care instructions adapted under license by [employer] (which disclaims liability or warranty for this information). If you have questions about a medical condition or this instruction, always ask your healthcare professional. [address] any warranty or liability for your use of this information. Nutrition During Pregnancy: Care Instructions  Overview     Healthy eating when you are pregnant is important for you and your baby. It can help you feel well and have a successful pregnancy and delivery. During pregnancy your nutrition needs increase. Even if you have excellent eating habits, your doctor may recommend a multivitamin to make sure you get enough iron and folic acid. You may wonder how much weight you should gain. In general, if you were at a healthy weight before you became pregnant, then you should gain between 25 and 35 pounds. If you were overweight before pregnancy, then you'll likely be advised to gain 15 to 25 pounds. If you were underweight before pregnancy, then you'll probably be advised to gain 28 to 40 pounds. Your doctor will work with you to set a weight goal that is right for you. Gaining a healthy amount of weight helps you have a healthy baby. Follow-up care is a key part of your treatment and safety. Be sure to make and go to all appointments, and call your doctor if you are having problems. It's also a good idea to know your test results and keep a list of the medicines you take. How can you care for yourself at home? · Eat plenty of fruits and vegetables. Include a variety of orange, yellow, and leafy dark-green vegetables every day. · Choose whole-grain bread, cereal, and pasta. Good choices include whole wheat bread, whole wheat pasta, brown rice, and oatmeal.  · Get 4 or more servings of milk and milk products each day. Good choices include nonfat or low-fat milk, yogurt, and cheese. If you cannot eat milk products, you can get calcium from calcium-fortified products such as orange juice, soy milk, and tofu. Other non-milk sources of calcium include leafy green vegetables, such as broccoli, kale, mustard greens, turnip greens, bok mady, and brussels sprouts. · If you eat meat, pick lower-fat types.  Good choices include lean cuts of meat and chicken or turkey without the skin.  · Do not eat shark, swordfish, arthur mackerel, or tilefish. They have high levels of mercury, which is dangerous to your baby. You can eat up to 12 ounces a week of fish or shellfish that have low mercury levels. Good choices include shrimp, wild salmon, pollock, and catfish. Limit some other types of fish, such as white (albacore) tuna, to 4 oz (0.1 kg) a week. · Heat lunch meats (such as turkey, ham, or bologna) to 165°F before you eat them. This reduces your risk of getting sick from a kind of bacteria that can be found in lunch meats. · Do not eat unpasteurized soft cheeses, such as brie, feta, fresh mozzarella, and blue cheese. They have a bacteria that could harm your baby. · Limit caffeine. If you drink coffee or tea, have no more than 1 cup a day. Caffeine is also found in kim. · Do not drink any alcohol. No amount of alcohol has been found to be safe during pregnancy. · Do not diet or try to lose weight. For example, do not follow a low-carbohydrate diet. If you are overweight at the start of your pregnancy, your doctor will work with you to manage your weight gain. · Tell your doctor about all vitamins and supplements you take. When should you call for help? Watch closely for changes in your health, and be sure to contact your doctor if you have any problems. Where can you learn more? Go to http://www.gray.com/  Enter Y785 in the search box to learn more about \"Nutrition During Pregnancy: Care Instructions. \"  Current as of: December 17, 2020               Content Version: 13.0  © 5828-3420 [employer]. Care instructions adapted under license by [employer] (which disclaims liability or warranty for this information). If you have questions about a medical condition or this instruction, always ask your healthcare professional. [address] any warranty or liability for your use of this information.          Managing Morning Sickness: Care Instructions  Overview     Morning sickness can be the toughest part of early pregnancy. Some people feel mildly sick to their stomach, and others are running to the bathroom. The good news? Morning sickness usually gets better in the second trimester. It's likely that your hormones are to blame for morning sickness. But you can do things to feel better, like changing what you eat, avoiding certain foods and smells, and asking your doctor about medicines you can try. Follow-up care is a key part of your treatment and safety. Be sure to make and go to all appointments, and call your doctor if you are having problems. It's also a good idea to know your test results and keep a list of the medicines you take. How can you care for yourself at home? · Keep food in your stomach, but not too much at once. Your nausea may be worse if your stomach is empty. Eat five or six small meals a day instead of three large meals. · For morning nausea, eat a small snack, such as a couple of crackers or dry biscuits, before rising. Allow a few minutes for your stomach to settle before you get out of bed slowly. · Drink plenty of fluids. If you have kidney, heart, or liver disease and have to limit fluids, talk with your doctor before you increase the amount of fluids you drink. Some women find that peppermint tea helps with nausea. · Eat more protein, such as chicken, fish, lean meat, beans, nuts, and seeds. · Eat carbohydrate foods, such as potatoes, whole-grain cereals, rice, and pasta. · Avoid smells and foods that make you feel nauseated. Spicy or high-fat foods, citrus juice, milk, coffee, and tea with caffeine often make nausea worse. · Do not drink alcohol. · Do not smoke. Try not to be around others who smoke. If you need help quitting, talk to your doctor about stop-smoking programs and medicines. These can increase your chances of quitting for good.   · If you are taking iron supplements, ask your doctor if they are necessary. Iron can make nausea worse. · Get lots of rest. Stress and fatigue can make your morning sickness worse. · Ask your doctor about taking prescription medicine, or over-the-counter products such as vitamin B6, doxylamine, or pablo, to relieve your symptoms. Your doctor can tell you the doses that are safe for you. · Take your prenatal vitamins at night on a full stomach. When should you call for help? Call 911 anytime you think you may need emergency care. For example, call if:    · You passed out (lost consciousness). Call your doctor now or seek immediate medical care if:    · You are sick to your stomach or cannot drink fluids.     · You have symptoms of dehydration, such as:  ? Dry eyes and a dry mouth. ? Passing only a little urine. ? Feeling thirstier than usual.     · You are not able to keep down your medicine.     · You have pain in your belly or pelvis. Watch closely for changes in your health, and be sure to contact your doctor if:    · You do not get better as expected. Where can you learn more? Go to http://www.gray.com/  Enter W450 in the search box to learn more about \"Managing Morning Sickness: Care Instructions. \"  Current as of: June 16, 2021               Content Version: 13.0  © 7199-1479 [employer]. Care instructions adapted under license by [employer] (which disclaims liability or warranty for this information). If you have questions about a medical condition or this instruction, always ask your healthcare professional. [place] [address] any warranty or liability for your use of this information. Learning About Pregnancy  Your Care Instructions     Your health in the early weeks of your pregnancy is particularly important for your baby's health. Take good care of yourself. Anything you do that harms your body can also harm your baby.   Make sure to go to all of your doctor appointments. Regular checkups will help keep you and your baby healthy. How can you care for yourself at home? Diet    · Eat a balanced diet. Make sure your diet includes plenty of beans, peas, and leafy green vegetables.     · Do not skip meals or go for many hours without eating. If you are nauseated, try to eat a small, healthy snack every 2 to 3 hours.     · Do not eat fish that has a high level of mercury, such as shark, swordfish, or mackerel. Do not eat more than one can of tuna each week.     · Drink plenty of fluids. If you have kidney, heart, or liver disease and have to limit fluids, talk with your doctor before you increase the amount of fluids you drink.     · Cut down on caffeine, such as coffee, tea, and cola.     · Do not drink alcohol, such as beer, wine, or hard liquor.     · Take a multivitamin that contains at least 400 micrograms (mcg) of folic acid to help prevent birth defects. Fortified cereal and whole wheat bread are good additional sources of folic acid.     · Increase the calcium in your diet. Try to drink a quart of skim milk each day. You may also take calcium supplements and choose foods such as cheese and yogurt. Lifestyle    · Make sure you go to your follow-up appointments.     · Get plenty of rest. You may be unusually tired while you are pregnant.     · Get at least 30 minutes of exercise on most days of the week. Walking is a good choice. If you have not exercised in the past, start out slowly. Take several short walks each day.     · Do not smoke. If you need help quitting, talk to your doctor about stop-smoking programs. These can increase your chances of quitting for good.     · Do not touch cat feces or litter boxes. Also, wash your hands after you handle raw meat, and fully cook all meat before you eat it. Wear gloves when you work in the yard or garden, and wash your hands well when you are done.  Cat feces, raw or undercooked meat, and contaminated dirt can cause an infection that may harm your baby or lead to a miscarriage.     · Do not use saunas or hot tubs. Raising your body temperature may harm your baby.     · Avoid chemical fumes, paint fumes, or poisons.     · Do not use illegal drugs, marijuana, or alcohol. Medicines    · Review all of your medicines with your doctor. Some of your routine medicines may need to be changed to protect your baby.     · Use acetaminophen (Tylenol) to relieve minor problems, such as a mild headache or backache or a mild fever with cold symptoms. Do not use nonsteroidal anti-inflammatory drugs (NSAIDs), such as ibuprofen (Advil, Motrin) or naproxen (Aleve), unless your doctor says it is okay.     · Do not take two or more pain medicines at the same time unless the doctor told you to. Many pain medicines have acetaminophen, which is Tylenol. Too much acetaminophen (Tylenol) can be harmful.     · Take your medicines exactly as prescribed. Call your doctor if you think you are having a problem with your medicine. To manage morning sickness    · If you feel sick when you first wake up, try eating a small snack (such as crackers) before you get out of bed. Allow some time to digest the snack, and then get out of bed slowly.     · Do not skip meals or go for long periods without eating. An empty stomach can make nausea worse.     · Eat small, frequent meals instead of three large meals each day.     · Drink plenty of fluids.     · Eat foods that are high in protein but low in fat.     · If you are taking iron supplements, ask your doctor if they are necessary. Iron can make nausea worse.     · Avoid any smells, such as coffee, that make you feel sick.     · Get lots of rest. Morning sickness may be worse when you are tired. Follow-up care is a key part of your treatment and safety. Be sure to make and go to all appointments, and call your doctor if you are having problems.  It's also a good idea to know your test results and keep a list of the medicines you take. Where can you learn more? Go to http://www.gray.com/  Enter O501 in the search box to learn more about \"Learning About Pregnancy. \"  Current as of: June 16, 2021               Content Version: 13.0  © 8703-9842 [employer]. Care instructions adapted under license by [employer] (which disclaims liability or warranty for this information). If you have questions about a medical condition or this instruction, always ask your healthcare professional. [address] any warranty or liability for your use of this information.

## 2022-02-22 LAB
C TRACH RRNA CVX QL NAA+PROBE: NEGATIVE
CYTOLOGIST CVX/VAG CYTO: NORMAL
CYTOLOGY CVX/VAG DOC CYTO: NORMAL
CYTOLOGY CVX/VAG DOC THIN PREP: NORMAL
DX ICD CODE: NORMAL
HPV I/H RISK 4 DNA CVX QL PROBE+SIG AMP: NEGATIVE
Lab: NORMAL
N GONORRHOEA RRNA CVX QL NAA+PROBE: NEGATIVE
OTHER STN SPEC: NORMAL
STAT OF ADQ CVX/VAG CYTO-IMP: NORMAL
T VAGINALIS RRNA SPEC QL NAA+PROBE: NEGATIVE

## 2022-02-28 ENCOUNTER — TELEPHONE (OUTPATIENT)
Dept: OBGYN CLINIC | Age: 27
End: 2022-02-28

## 2022-02-28 NOTE — PROGRESS NOTES
Jake Kilpatrick is a 27 y.o. female, , Patient's last menstrual period was 12/10/2021., who presents today for the following:  Chief Complaint   Patient presents with    Initial Prenatal Visit     Pt c/o nausea        No Known Allergies    Current Outpatient Medications   Medication Sig    ondansetron (Zofran ODT) 4 mg disintegrating tablet Take 1 Tablet by mouth every eight (8) hours as needed for Nausea or Vomiting.  metoclopramide HCl (REGLAN) 10 mg tablet Take 1 Tablet by mouth Before breakfast, lunch, dinner and at bedtime for 30 days.  pyridoxine, vitamin B6, (VITAMIN B-6) 50 mg tablet Take 1 Tablet by mouth daily. Indications: excessive vomiting in pregnancy     No current facility-administered medications for this visit. Past Medical History:   Diagnosis Date    Anxiety        History reviewed. No pertinent surgical history. History reviewed. No pertinent family history. Social History     Socioeconomic History    Marital status: SINGLE     Spouse name: Not on file    Number of children: Not on file    Years of education: Not on file    Highest education level: Not on file   Occupational History    Not on file   Tobacco Use    Smoking status: Never Smoker    Smokeless tobacco: Never Used   Substance and Sexual Activity    Alcohol use: Not Currently    Drug use: Yes     Types: Marijuana    Sexual activity: Yes     Partners: Male     Birth control/protection: None   Other Topics Concern    Not on file   Social History Narrative    Not on file     Social Determinants of Health     Financial Resource Strain:     Difficulty of Paying Living Expenses: Not on file   Food Insecurity:     Worried About Running Out of Food in the Last Year: Not on file    Meka of Food in the Last Year: Not on file   Transportation Needs:     Lack of Transportation (Medical): Not on file    Lack of Transportation (Non-Medical):  Not on file   Physical Activity:     Days of Exercise per Week: Not on file    Minutes of Exercise per Session: Not on file   Stress:     Feeling of Stress : Not on file   Social Connections:     Frequency of Communication with Friends and Family: Not on file    Frequency of Social Gatherings with Friends and Family: Not on file    Attends [employer] Services: Not on file    Active Member of [address] [place] [address] [employer] or Organizations: Not on file    Attends Club or Organization Meetings: Not on file    Marital Status: Not on file   Intimate Partner Violence:     Fear of Current or Ex-Partner: Not on file    Emotionally Abused: Not on file    Physically Abused: Not on file    Sexually Abused: Not on file   Housing Stability:     Unable to Pay for Housing in the Last Year: Not on file    Number of [place] in the Last Year: Not on file    Unstable Housing in the Last Year: Not on file         HPI   New OB visit  Doing well  No pain no bleeding  + nausea      Review of Systems   Constitutional: Negative. Respiratory: Negative. Cardiovascular: Negative. Gastrointestinal: Positive for nausea and vomiting. Genitourinary: Negative. Musculoskeletal: Negative. Skin: Negative. Neurological: Negative. Endo/Heme/Allergies: Negative. Psychiatric/Behavioral: Negative. All other systems reviewed and are negative. /85 (BP 1 Location: Left upper arm, BP Patient Position: Sitting)   Pulse 98   Ht 5' 2\" (1.575 m)   Wt 236 lb (107 kg)   LMP 12/10/2021   SpO2 95%   BMI 43.16 kg/m²    OBGyn Exam     PE:  Constitutional: General Appearance: healthy-appearing, well-nourished, well-developed, and well groomed. Psychiatric: Orientation: to time, place, and person. Mood and Affect: normal mood and affect and appropriate and active and alert. Abdomen: Auscultation/Inspection/Palpation: tenderness and mass and non-distended. Hernia: none palpated. Female Genitalia: Vulva: no masses, atrophy, or lesions.     Bladder/Urethra: no urethral discharge or mass and normal meatus and bladder non distended. Vagina no tenderness, erythema, cystocele, rectocele, abnormal vaginal discharge, or vesicle(s) or ulcers. Cervix: no discharge or cervical motion tenderness and grossly normal.     Uterus: mobile, non-tender, and no uterine prolapse. Adnexa/Parametria: no adnexal tenderness. 1. Prenatal care, antepartum    - HEP B SURFACE AG  - T PALLIDUM SCREEN W/REFLEX  - RUBELLA AB, IGG  - TYPE & SCREEN  - HIV 1/2 AG/AB, 4TH GENERATION,W RFLX CONFIRM  - CBC WITH AUTOMATED DIFF  - HEMOGLOBIN FRACTIONATION  - THYROID CASCADE PROFILE  - CMV AB, IGG  - CMV AB, IGM  - CYSTIC FIBROSIS MUTATION 97  - VITAMIN D, 25 HYDROXY  - HEPATITIS C AB  - PAP IG, CT-NG-TV, APTIMA HPV AND RFX 16/18,45([id number])    2. 9 weeks gestation of pregnancy      3. Gynecologic exam normal      4. Pregnancy with uncertain fetal viability, single or unspecified fetus    - AMB POC US OB < 14 WKS, 1ST GESTATION        Follow-up and Dispositions    · Return in about 4 weeks (around 3/17/2022) for ob.

## 2022-03-18 LAB
25(OH)D3+25(OH)D2 SERPL-MCNC: 15.4 NG/ML (ref 30–100)
ABO GROUP BLD: NORMAL
BASOPHILS # BLD AUTO: 0 X10E3/UL (ref 0–0.2)
BASOPHILS NFR BLD AUTO: 0 %
BLD GP AB SCN SERPL QL: NEGATIVE
CFTR MUT ANL BLD/T: NORMAL
CMV IGG SERPL IA-ACNC: 7.7 U/ML (ref 0–0.59)
CMV IGM SERPL IA-ACNC: <30 AU/ML (ref 0–29.9)
EOSINOPHIL # BLD AUTO: 0 X10E3/UL (ref 0–0.4)
EOSINOPHIL NFR BLD AUTO: 0 %
ERYTHROCYTE [DISTWIDTH] IN BLOOD BY AUTOMATED COUNT: 14.1 % (ref 11.7–15.4)
GENE DIS ANL CARRIER INTERP-IMP: NORMAL
HBV SURFACE AG SERPL QL IA: NEGATIVE
HCT VFR BLD AUTO: 37.6 % (ref 34–46.6)
HCV AB S/CO SERPL IA: <0.1 S/CO RATIO (ref 0–0.9)
HGB A MFR BLD ELPH: 97.5 % (ref 96.4–98.8)
HGB A2 MFR BLD ELPH: 2.5 % (ref 1.8–3.2)
HGB BLD-MCNC: 12.3 G/DL (ref 11.1–15.9)
HGB F MFR BLD ELPH: 0 % (ref 0–2)
HGB FRACT BLD-IMP: NORMAL
HGB S MFR BLD ELPH: 0 %
HIV 1+2 AB+HIV1 P24 AG SERPL QL IA: NON REACTIVE
IMM GRANULOCYTES # BLD AUTO: 0 X10E3/UL (ref 0–0.1)
IMM GRANULOCYTES NFR BLD AUTO: 0 %
LYMPHOCYTES # BLD AUTO: 2.8 X10E3/UL (ref 0.7–3.1)
LYMPHOCYTES NFR BLD AUTO: 32 %
MCH RBC QN AUTO: 28.7 PG (ref 26.6–33)
MCHC RBC AUTO-ENTMCNC: 32.7 G/DL (ref 31.5–35.7)
MCV RBC AUTO: 88 FL (ref 79–97)
MONOCYTES # BLD AUTO: 0.8 X10E3/UL (ref 0.1–0.9)
MONOCYTES NFR BLD AUTO: 9 %
NEUTROPHILS # BLD AUTO: 5.2 X10E3/UL (ref 1.4–7)
NEUTROPHILS NFR BLD AUTO: 59 %
PLATELET # BLD AUTO: 233 X10E3/UL (ref 150–450)
RBC # BLD AUTO: 4.29 X10E6/UL (ref 3.77–5.28)
RH BLD: POSITIVE
RUBV IGG SERPL IA-ACNC: 1.43 INDEX
TREPONEMA PALLIDUM IGG+IGM AB [PRESENCE] IN SERUM OR PLASMA BY IMMUNOASSAY: NON REACTIVE
TSH SERPL DL<=0.005 MIU/L-ACNC: 2.06 UIU/ML (ref 0.45–4.5)
WBC # BLD AUTO: 8.9 X10E3/UL (ref 3.4–10.8)

## 2022-03-20 PROBLEM — U07.1 COVID-19: Status: ACTIVE | Noted: 2021-08-31

## 2022-03-21 ENCOUNTER — ROUTINE PRENATAL (OUTPATIENT)
Dept: OBGYN CLINIC | Age: 27
End: 2022-03-21
Payer: MEDICAID

## 2022-03-21 VITALS
DIASTOLIC BLOOD PRESSURE: 77 MMHG | HEIGHT: 62 IN | SYSTOLIC BLOOD PRESSURE: 138 MMHG | HEART RATE: 86 BPM | OXYGEN SATURATION: 98 % | BODY MASS INDEX: 45.27 KG/M2 | RESPIRATION RATE: 16 BRPM | WEIGHT: 246 LBS

## 2022-03-21 DIAGNOSIS — N76.0 VAGINITIS AND VULVOVAGINITIS: ICD-10-CM

## 2022-03-21 DIAGNOSIS — E55.9 VITAMIN D DEFICIENCY: ICD-10-CM

## 2022-03-21 DIAGNOSIS — Z34.90 PRENATAL CARE, ANTEPARTUM: Primary | ICD-10-CM

## 2022-03-21 DIAGNOSIS — Z36.9 UNSPECIFIED ANTENATAL SCREENING: ICD-10-CM

## 2022-03-21 DIAGNOSIS — Z3A.13 13 WEEKS GESTATION OF PREGNANCY: ICD-10-CM

## 2022-03-21 PROCEDURE — 0501F PRENATAL FLOW SHEET: CPT | Performed by: OBSTETRICS & GYNECOLOGY

## 2022-03-21 RX ORDER — MELATONIN
1000 DAILY
Qty: 90 TABLET | Refills: 1 | Status: ON HOLD | OUTPATIENT
Start: 2022-03-21 | End: 2022-07-16

## 2022-03-21 RX ORDER — TERCONAZOLE 4 MG/G
1 CREAM VAGINAL
Qty: 45 G | Refills: 0 | Status: SHIPPED | OUTPATIENT
Start: 2022-03-21 | End: 2022-06-22 | Stop reason: ALTCHOICE

## 2022-03-21 NOTE — PROGRESS NOTES
Visit Vitals  BP (!) 140/76 (BP 1 Location: Right arm, BP Patient Position: Sitting)   Pulse 86   Resp 16   Ht 5' 2\" (1.575 m)   Wt 246 lb (111.6 kg)   LMP 12/10/2021   SpO2 98%   BMI 44.99 kg/m²     Pt is c/o yeast infection.

## 2022-03-21 NOTE — PATIENT INSTRUCTIONS
Weeks 10 to 14 of Your Pregnancy: Care Instructions  Overview     By weeks 10 to 14 of your pregnancy, the placenta has formed inside your uterus. The placenta's main job is to give your baby oxygen and nutrients through the umbilical cord. It's possible to hear your baby's heartbeat with a special ultrasound device. Your baby's organs are developing. The arms and legs can bend. This is a good time to think about testing for birth defects. There are two types of tests: screening and diagnostic. Screening tests show the chance that a baby has a certain birth defect. They can't tell you for sure that your baby has a problem. Diagnostic tests show if a baby has a certain birth defect. It's your choice whether to have these tests. You and your partner can talk to your doctor or midwife about tests for birth defects. Follow-up care is a key part of your treatment and safety. Be sure to make and go to all appointments, and call your doctor if you are having problems. It's also a good idea to know your test results and keep a list of the medicines you take. How can you care for yourself at home? Decide about tests  · You can have screening tests and diagnostic tests to check for birth defects. The decision to have a test for birth defects is personal. Think about your age, your chance of passing on a family disease, your need to know about any problems, and what you might do after you have the test results. ? Quadruple (quad) blood test. This screening test can be done between 15 and 22 weeks of pregnancy. It checks the amount of four substances in your blood. The doctor looks at these test results, along with your age and other factors, to find out the chance that your baby may have certain problems. ? Amniocentesis. This diagnostic test is used to look for chromosomal problems in the baby's cells.  It can be done between 15 and 20 weeks of pregnancy, usually around week 16.  ? Nuchal translucency test. This test uses ultrasound to measure the thickness of the area at the back of the baby's neck. An increase in the thickness can be an early sign of Down syndrome. ? Chorionic villus sampling (CVS). This is a test that looks for certain genetic problems with your baby. The same genes that are in your baby are in the placenta. A small piece of the placenta is taken out and tested. This test is done when you are 10 to 13 weeks pregnant. Ease discomfort  · Slow down and take naps when you feel tired. · If your emotions swing, talk to someone. · If your gums bleed, try a softer toothbrush. If your gums are puffy and bleed a lot, see your dentist.  · If you feel dizzy:  ? Get up slowly after sitting or lying down. ? Drink plenty of fluids. ? Eat small snacks to keep your blood sugar stable. ? Put your head between your legs as though you were tying your shoelaces. ? Lie down with your legs higher than your head. Use pillows to prop up your feet. · If you have a headache:  ? Lie down. ? Ask your partner or a good friend for a neck massage. ? Try cool cloths over your forehead or across the back of your neck. ? Use acetaminophen (Tylenol) for pain relief. Do not use nonsteroidal anti-inflammatory drugs (NSAIDs), such as ibuprofen (Advil, Motrin) or naproxen (Aleve), unless your doctor says it is okay. · If you have a nosebleed, pinch your nose gently, and hold it for a short while. To prevent nosebleeds, try massaging a small dab of petroleum jelly, such as Vaseline, in your nostrils. · If your nose is stuffed up, try saline (saltwater) nose sprays. Do not use decongestant sprays. Care for your breasts  · Wear a bra that gives you good support. · Know that changes in your breasts are normal.  ? Your breasts may get larger and more tender. Tenderness usually gets better by 12 weeks. ? Your nipples may get darker and larger, and small bumps around your nipples may show more. ?  The veins in your chest and breasts may show more. Where can you learn more? Go to http://www.gray.com/  Enter F444 in the search box to learn more about \"Weeks 10 to 14 of Your Pregnancy: Care Instructions. \"  Current as of: June 16, 2021               Content Version: 13.2  © 0877-9555 Healthwise, [employer]. Care instructions adapted under license by [employer] (which disclaims liability or warranty for this information). If you have questions about a medical condition or this instruction, always ask your healthcare professional. [place] [address] any warranty or liability for your use of this information.

## 2022-03-26 LAB
A VAGINAE DNA VAG QL NAA+PROBE: ABNORMAL SCORE
ABOUT THE TEST: NORMAL
BVAB2 DNA VAG QL NAA+PROBE: ABNORMAL SCORE
C ALBICANS DNA VAG QL NAA+PROBE: NEGATIVE
C GLABRATA DNA VAG QL NAA+PROBE: NEGATIVE
C KRUSEI DNA VAG QL NAA+PROBE: NEGATIVE
C LUSITANIAE DNA VAG QL NAA+PROBE: NEGATIVE
C TRACH DNA VAG QL NAA+PROBE: NEGATIVE
CANDIDA DNA VAG QL NAA+PROBE: NEGATIVE
FET TS 13 RISK PLAS.CFDNA QL: NEGATIVE
FETAL FRACTION: NORMAL
FETAL SEX: NORMAL
GA EST FROM CONCEPTION DATE: NORMAL D
GESTATIONAL AGE >=9W: YES
LAB DIRECTOR NAME PROVIDER: NORMAL
LAB DIRECTOR NAME PROVIDER: NORMAL
LIMITATIONS OF THE TEST: NORMAL
MEGA1 DNA VAG QL NAA+PROBE: ABNORMAL SCORE
N GONORRHOEA DNA VAG QL NAA+PROBE: NEGATIVE
NEGATIVE PREDICTIVE VALUE: NORMAL
NOTE: NORMAL
PERFORMANCE CHARACTERISTICS: NORMAL
POSITIVE PREDICTIVE VALUE: NORMAL
REF LAB TEST METHOD: NORMAL
REFERENCES: NORMAL
RESULT, 451942: NEGATIVE
T VAGINALIS DNA VAG QL NAA+PROBE: NEGATIVE
TEST PERFORMANCE INFO SPEC: NORMAL
TRISOMY 18 (EDWARDS SYNDROME): NEGATIVE
TRISOMY 21 (DOWN SYNDROME): NEGATIVE

## 2022-04-14 ENCOUNTER — TELEPHONE (OUTPATIENT)
Dept: OBGYN CLINIC | Age: 27
End: 2022-04-14

## 2022-04-14 DIAGNOSIS — N76.0 BV (BACTERIAL VAGINOSIS): Primary | ICD-10-CM

## 2022-04-14 DIAGNOSIS — B96.89 BV (BACTERIAL VAGINOSIS): Primary | ICD-10-CM

## 2022-04-14 RX ORDER — METRONIDAZOLE 500 MG/1
500 TABLET ORAL 2 TIMES DAILY
Qty: 14 TABLET | Refills: 0 | Status: SHIPPED | OUTPATIENT
Start: 2022-04-14 | End: 2022-04-21

## 2022-04-14 NOTE — TELEPHONE ENCOUNTER
Jesse for patient to call the office for abnormal lab results. Prescription has been sent to the pharmacy.

## 2022-04-26 ENCOUNTER — ROUTINE PRENATAL (OUTPATIENT)
Dept: OBGYN CLINIC | Age: 27
End: 2022-04-26
Payer: MEDICAID

## 2022-04-26 VITALS
WEIGHT: 250 LBS | HEIGHT: 62 IN | SYSTOLIC BLOOD PRESSURE: 134 MMHG | DIASTOLIC BLOOD PRESSURE: 84 MMHG | HEART RATE: 106 BPM | OXYGEN SATURATION: 100 % | RESPIRATION RATE: 18 BRPM | BODY MASS INDEX: 46.01 KG/M2

## 2022-04-26 DIAGNOSIS — Z34.90 PRENATAL CARE, ANTEPARTUM: Primary | ICD-10-CM

## 2022-04-26 DIAGNOSIS — Z3A.18 18 WEEKS GESTATION OF PREGNANCY: ICD-10-CM

## 2022-04-26 DIAGNOSIS — Z36.9 UNSPECIFIED ANTENATAL SCREENING: ICD-10-CM

## 2022-04-26 PROCEDURE — 0502F SUBSEQUENT PRENATAL CARE: CPT | Performed by: OBSTETRICS & GYNECOLOGY

## 2022-04-28 LAB
2ND TRIMESTER 4 SCREEN SERPL-IMP: NORMAL
AFP ADJ MOM SERPL: 1.52
AFP SERPL-MCNC: 56.3 NG/ML
AGE AT DELIVERY: 28.9 YR
FET TS 18 RISK FROM MAT AGE: NORMAL
FET TS 21 RISK FROM MAT AGE: 789
GA METHOD: NORMAL
GA: 18 WEEKS
HCG ADJ MOM SERPL: 0.83
HCG SERPL-ACNC: NORMAL MIU/ML
IDDM PATIENT QL: NO
INHIBIN A ADJ MOM SERPL: 2.83
INHIBIN A SERPL-MCNC: 326.62 PG/ML
MULTIPLE PREGNANCY: NO
NEURAL TUBE DEFECT RISK FETUS: 5182 %
SERVICE CMNT-IMP: NORMAL
TS 18 RISK FETUS: NORMAL
TS 21 RISK FETUS: 1339
U ESTRIOL ADJ MOM SERPL: 0.97
U ESTRIOL SERPL-MCNC: 1.2 NG/ML

## 2022-05-24 ENCOUNTER — ROUTINE PRENATAL (OUTPATIENT)
Dept: OBGYN CLINIC | Age: 27
End: 2022-05-24
Payer: MEDICAID

## 2022-05-24 VITALS
HEART RATE: 78 BPM | BODY MASS INDEX: 46.19 KG/M2 | DIASTOLIC BLOOD PRESSURE: 80 MMHG | RESPIRATION RATE: 16 BRPM | HEIGHT: 62 IN | OXYGEN SATURATION: 97 % | WEIGHT: 251 LBS | SYSTOLIC BLOOD PRESSURE: 132 MMHG

## 2022-05-24 DIAGNOSIS — Z3A.22 22 WEEKS GESTATION OF PREGNANCY: ICD-10-CM

## 2022-05-24 DIAGNOSIS — Z34.90 PRENATAL CARE, ANTEPARTUM: Primary | ICD-10-CM

## 2022-05-24 PROCEDURE — 0502F SUBSEQUENT PRENATAL CARE: CPT | Performed by: OBSTETRICS & GYNECOLOGY

## 2022-05-24 NOTE — PATIENT INSTRUCTIONS
Counting Your Baby's Kicks: Care Instructions  Overview     Counting your baby's kicks is one way your doctor can tell that your baby is healthy. Most women--especially in a first pregnancy--feel their baby move for the first time between 16 and 22 weeks. The movement may feel like flutters rather than kicks. Your baby may move more at certain times of the day. When you are active, you may notice less kicking than when you are resting. At your prenatal visits, your doctor will ask whether the baby is active. In your last trimester, your doctor may ask you to count the number of times you feel your baby move. Follow-up care is a key part of your treatment and safety. Be sure to make and go to all appointments, and call your doctor if you are having problems. It's also a good idea to know your test results and keep a list of the medicines you take. How do you count fetal kicks? · A common method of checking your baby's movement is to note the length of time it takes to count ten movements (such as kicks, flutters, or rolls). · Pick your baby's most active time of day to count. This may be any time from morning to evening. · If you don't feel 10 movements in an hour, have something to eat or drink and count for another hour. If you don't feel at least 10 movements in the 2-hour period, call your doctor. When should you call for help? Call your doctor now or seek immediate medical care if:    · You noticed that your baby has stopped moving or is moving much less than normal.   Watch closely for changes in your health, and be sure to contact your doctor if you have any problems. Where can you learn more? Go to http://www.gray.com/  Enter [id number] in the search box to learn more about \"Counting Your Baby's Kicks: Care Instructions. \"  Current as of: June 16, 2021               Content Version: 13.2  © 5330-5205 Healthwise, [employer].    Care instructions adapted under license by [employer] (which disclaims liability or warranty for this information). If you have questions about a medical condition or this instruction, always ask your healthcare professional. [address] any warranty or liability for your use of this information.

## 2022-05-24 NOTE — PROGRESS NOTES
SUBJECTIVE:  Renny Bond presents today for return prenatal visit. She complains of: no unusual complaints. Patient's medical, obstetrical, social, and family histories; medications; and lab results have been reviewed. REVIEW OF SYSTEMS: A comprehensive review of systems was negative except for that written in the HPI.    22w6d    OBJECTIVE:   Visit Vitals  BP (!) 132/90 (BP 1 Location: Right arm, BP Patient Position: Sitting)   Pulse 78   Resp 16   Ht 5' 2\" (1.575 m)   Wt 251 lb (113.9 kg)   LMP 12/10/2021   SpO2 97%   BMI 45.91 kg/m²      Refer to Prenatal Physical for exam findings    ASSESSMENT/PLAN:  Prenatal Education: 2nd trimester topics:  pregnancy danger signs and signs and symptoms of  labor  Patient/guardian understands and agrees with the plan of care.

## 2022-06-22 ENCOUNTER — ROUTINE PRENATAL (OUTPATIENT)
Dept: OBGYN CLINIC | Age: 27
End: 2022-06-22
Payer: MEDICAID

## 2022-06-22 VITALS
DIASTOLIC BLOOD PRESSURE: 73 MMHG | BODY MASS INDEX: 45.08 KG/M2 | HEART RATE: 99 BPM | HEIGHT: 62 IN | RESPIRATION RATE: 16 BRPM | WEIGHT: 245 LBS | OXYGEN SATURATION: 100 % | SYSTOLIC BLOOD PRESSURE: 137 MMHG

## 2022-06-22 DIAGNOSIS — Z3A.27 27 WEEKS GESTATION OF PREGNANCY: ICD-10-CM

## 2022-06-22 DIAGNOSIS — Z34.90 PRENATAL CARE, ANTEPARTUM: Primary | ICD-10-CM

## 2022-06-22 PROCEDURE — 0502F SUBSEQUENT PRENATAL CARE: CPT | Performed by: OBSTETRICS & GYNECOLOGY

## 2022-06-22 NOTE — PROGRESS NOTES
SUBJECTIVE:  Liv Moulton presents today for return prenatal visit. She complains of: no unusual complaints. Patient's medical, obstetrical, social, and family histories; medications; and lab results have been reviewed. REVIEW OF SYSTEMS: A comprehensive review of systems was negative except for that written in the HPI. 27w0d    OBJECTIVE:   Visit Vitals  /73 (BP 1 Location: Right arm, BP Patient Position: Sitting)   Pulse 99   Resp 16   Ht 5' 2\" (1.575 m)   Wt 245 lb (111.1 kg)   LMP 12/10/2021   SpO2 100%   BMI 44.81 kg/m²      Refer to Prenatal Physical for exam findings    ASSESSMENT/PLAN:  Prenatal Education: 2nd trimester topics:  fetal movement count handout given, pregnancy danger signs and signs and symptoms of  labor  Patient/guardian understands and agrees with the plan of care.

## 2022-06-22 NOTE — PATIENT INSTRUCTIONS
Counting Your Baby's Kicks: Care Instructions  Overview     Counting your baby's kicks is one way your doctor can tell that your baby is healthy. Most women--especially in a first pregnancy--feel their baby move for the first time between 16 and 22 weeks. The movement may feel like flutters rather than kicks. Your baby may move more at certain times of the day. When you are active, you may notice less kicking than when you are resting. At your prenatal visits, your doctor will ask whether the baby is active. In your last trimester, your doctor may ask you to count the number of times you feel your baby move. Follow-up care is a key part of your treatment and safety. Be sure to make and go to all appointments, and call your doctor if you are having problems. It's also a good idea to know your test results and keep a list of the medicines you take. How do you count fetal kicks? · A common method of checking your baby's movement is to note the length of time it takes to count ten movements (such as kicks, flutters, or rolls). · Pick your baby's most active time of day to count. This may be any time from morning to evening. · If you don't feel 10 movements in an hour, have something to eat or drink and count for another hour. If you don't feel at least 10 movements in the 2-hour period, call your doctor. When should you call for help? Call your doctor now or seek immediate medical care if:    · You noticed that your baby has stopped moving or is moving much less than normal.   Watch closely for changes in your health, and be sure to contact your doctor if you have any problems. Where can you learn more? Go to http://www.gray.com/  Enter [id number] in the search box to learn more about \"Counting Your Baby's Kicks: Care Instructions. \"  Current as of: June 16, 2021               Content Version: 13.2  © 1071-6110 [employer].    Care instructions adapted under license by [employer] (which disclaims liability or warranty for this information). If you have questions about a medical condition or this instruction, always ask your healthcare professional. [address] any warranty or liability for your use of this information.

## 2022-07-10 ENCOUNTER — HOSPITAL ENCOUNTER (OUTPATIENT)
Age: 27
Discharge: HOME | End: 2022-07-10
Attending: OBSTETRICS & GYNECOLOGY | Admitting: OBSTETRICS & GYNECOLOGY
Payer: MEDICAID

## 2022-07-10 VITALS
HEIGHT: 62 IN | SYSTOLIC BLOOD PRESSURE: 120 MMHG | BODY MASS INDEX: 45.08 KG/M2 | RESPIRATION RATE: 22 BRPM | WEIGHT: 245 LBS | DIASTOLIC BLOOD PRESSURE: 50 MMHG | OXYGEN SATURATION: 100 % | HEART RATE: 82 BPM | TEMPERATURE: 98 F

## 2022-07-10 PROBLEM — Z34.90 PREGNANT: Status: ACTIVE | Noted: 2022-07-10

## 2022-07-10 LAB
APPEARANCE UR: ABNORMAL
BACTERIA URNS QL MICRO: NEGATIVE /HPF
BILIRUB UR QL: NEGATIVE
COLOR UR: ABNORMAL
GLUCOSE UR STRIP.AUTO-MCNC: NEGATIVE MG/DL
HGB UR QL STRIP: ABNORMAL
KETONES UR QL STRIP.AUTO: NEGATIVE MG/DL
LEUKOCYTE ESTERASE UR QL STRIP.AUTO: NEGATIVE
NITRITE UR QL STRIP.AUTO: NEGATIVE
PH UR STRIP: 6 [PH] (ref 5–8)
PROT UR STRIP-MCNC: NEGATIVE MG/DL
RBC #/AREA URNS HPF: ABNORMAL /HPF (ref 0–5)
SP GR UR REFRACTOMETRY: 1.01 (ref 1–1.03)
UROBILINOGEN UR QL STRIP.AUTO: 0.1 EU/DL (ref 0.1–1)
WBC URNS QL MICRO: ABNORMAL /HPF (ref 0–4)

## 2022-07-10 PROCEDURE — 74011250636 HC RX REV CODE- 250/636: Performed by: OBSTETRICS & GYNECOLOGY

## 2022-07-10 PROCEDURE — 96374 THER/PROPH/DIAG INJ IV PUSH: CPT

## 2022-07-10 PROCEDURE — 75810000275 HC EMERGENCY DEPT VISIT NO LEVEL OF CARE

## 2022-07-10 PROCEDURE — 96361 HYDRATE IV INFUSION ADD-ON: CPT

## 2022-07-10 PROCEDURE — 99285 EMERGENCY DEPT VISIT HI MDM: CPT

## 2022-07-10 PROCEDURE — 99212 OFFICE O/P EST SF 10 MIN: CPT | Performed by: OBSTETRICS & GYNECOLOGY

## 2022-07-10 PROCEDURE — 81001 URINALYSIS AUTO W/SCOPE: CPT

## 2022-07-10 RX ORDER — ONDANSETRON 2 MG/ML
4 INJECTION INTRAMUSCULAR; INTRAVENOUS
Status: DISCONTINUED | OUTPATIENT
Start: 2022-07-10 | End: 2022-07-10 | Stop reason: HOSPADM

## 2022-07-10 RX ADMIN — SODIUM CHLORIDE, POTASSIUM CHLORIDE, SODIUM LACTATE AND CALCIUM CHLORIDE 1000 ML: 600; 310; 30; 20 INJECTION, SOLUTION INTRAVENOUS at 06:48

## 2022-07-10 RX ADMIN — ONDANSETRON 4 MG: 2 INJECTION INTRAMUSCULAR; INTRAVENOUS at 06:59

## 2022-07-10 RX ADMIN — SODIUM CHLORIDE, POTASSIUM CHLORIDE, SODIUM LACTATE AND CALCIUM CHLORIDE 1000 ML: 600; 310; 30; 20 INJECTION, SOLUTION INTRAVENOUS at 08:08

## 2022-07-10 NOTE — PROGRESS NOTES
0601: Patient arrives to labor and delivery from ED via wheelchair complaining of nausea and vomiting. Patient states she started vomiting last night around 1800 while doing laundry. Patient denies any leaking of fluid and vaginal bleeding. Patient states she is having some clear, mucousy discharge at this time. Patient endorses fetal movement at this time. Patient states she is nauseous at this time. Patient is able to ambulate to the bathroom and change into gown and provide urine specimen at this time.     0702: Bedside shift report given to VIKA Cruz RN.

## 2022-07-10 NOTE — H&P
History & Physical    Name: Marcelo Sanders MRN: [id number]  SSN: [id number]    YOB: 1995  Age: 27 y.o. Sex: female        Subjective:     Estimated Date of Delivery: 22  OB History    Para Term  AB Living   1             SAB IAB Ectopic Molar Multiple Live Births                    # Outcome Date GA Lbr Nacho/2nd Weight Sex Delivery Anes PTL Lv   1 Current                Ms. Emma Amor is admitted with pregnancy at 29w4d for nausea. Prenatal course was normal. Please see prenatal records for details. Past Medical History:   Diagnosis Date    Anxiety      History reviewed. No pertinent surgical history. Social History     Occupational History    Not on file   Tobacco Use    Smoking status: Never Smoker    Smokeless tobacco: Never Used   Vaping Use    Vaping Use: Never used   Substance and Sexual Activity    Alcohol use: Not Currently    Drug use: Not Currently     Types: Marijuana    Sexual activity: Yes     Partners: Male     Birth control/protection: None     History reviewed. No pertinent family history. No Known Allergies  Prior to Admission medications    Medication Sig Start Date End Date Taking? Authorizing Provider   pyridoxine, vitamin B6, (VITAMIN B-6) 50 mg tablet Take 1 Tablet by mouth daily. Indications: excessive vomiting in pregnancy 22  Yes Zach Santana MD   cholecalciferol (VITAMIN D3) (1000 Units /25 mcg) tablet Take 1 Tablet by mouth daily. Indications: low vitamin D levels  Patient not taking: Reported on 7/10/2022 3/21/22   Laura Scott MD        Review of Systems: A comprehensive review of systems was negative except for that written in the HPI. Objective:     Vitals:  Vitals:    07/10/22 0612 07/10/22 0615 07/10/22 0700   BP: 129/80     Pulse: 95     Resp: 20  20   Temp: 98.5 °F (36.9 °C)     SpO2: 99%     Weight:  245 lb (111.1 kg)    Height:  5' 2\" (1.575 m)         Physical Exam:  Patient without distress.   Heart: Regular rate and rhythm  Lung: clear to auscultation throughout lung fields, no wheezes, no rales, no rhonchi and normal respiratory effort  Back: costovertebral angle tenderness absent  Abdomen: soft, nontender  Fundus: soft and non tender  Perineum: blood absent, amniotic fluid absent  Lower Extremities:  - Edema No  Membranes:  Intact  Fetal Heart Rate: Reactive    Prenatal Labs:   No results found for: ABORH, RUBELLAEXT, GRBSEXT, HBSAGEXT, HIVEXT, RPREXT, GONNOEXT, CHLAMEXT, ABORHEXT, RUBELLAEXT, GRBSEXT, HBSAGEXT, HIVEXT, RPREXT, GONNOEXT, CHLAMEXT      Assessment/Plan:     Active Problems:    Pregnant (7/10/2022)         Plan: Admit for IVH and antiemetics. Group B Strep was not tested.

## 2022-07-10 NOTE — PROGRESS NOTES
1604: Dr. Mccormack Read informed about pt's arrival and complaint. SBAR done. Strip reviewed. Orders received.

## 2022-07-10 NOTE — PROGRESS NOTES
0700 Received report from [facility], assumed care of the pt. No co N and V at present. S.O. at the bedside. 4565 Up to Br to void. No co n and v siince admission. 0830 Dr. Isamar Mack in to see pt. Views strip. Orders received and noted. Pt. To try and eat crackers and drink ginger ale. Pt. Given this. Pt.off EFM per orders. Pt. To be discharged after completing iv infusion and retaining po fluids and crackers. No vomiting noted since admission. 0900 Up to Br to void. 0920 Iv fluids completed. 0930 Iv d/c. Pt. Given antepartum instruction sheet with explanation, verbalizes understanding. 0935 Pt.left with her S.O. stable condition, ambulatory. No co n and V. Retained crackers and soda.

## 2022-07-16 ENCOUNTER — HOSPITAL ENCOUNTER (OUTPATIENT)
Age: 27
Discharge: HOME | End: 2022-07-16
Attending: OBSTETRICS & GYNECOLOGY | Admitting: OBSTETRICS & GYNECOLOGY
Payer: MEDICAID

## 2022-07-16 VITALS
TEMPERATURE: 99 F | WEIGHT: 245 LBS | RESPIRATION RATE: 18 BRPM | BODY MASS INDEX: 45.08 KG/M2 | OXYGEN SATURATION: 100 % | HEIGHT: 62 IN | HEART RATE: 107 BPM | DIASTOLIC BLOOD PRESSURE: 75 MMHG | SYSTOLIC BLOOD PRESSURE: 130 MMHG

## 2022-07-16 PROBLEM — Z34.90 PREGNANCY: Status: ACTIVE | Noted: 2022-07-16

## 2022-07-16 PROCEDURE — 75810000275 HC EMERGENCY DEPT VISIT NO LEVEL OF CARE

## 2022-07-16 PROCEDURE — 99284 EMERGENCY DEPT VISIT MOD MDM: CPT

## 2022-07-16 RX ORDER — VITAMIN A ACETATE, BETA CAROTENE, ASCORBIC ACID, CHOLECALCIFEROL, .ALPHA.-TOCOPHEROL ACETATE, DL-, THIAMINE MONONITRATE, RIBOFLAVIN, NIACINAMIDE, PYRIDOXINE HYDROCHLORIDE, FOLIC ACID, CYANOCOBALAMIN, CALCIUM CARBONATE, FERROUS FUMARATE, ZINC OXIDE, CUPRIC OXIDE 3080; 12; 120; 400; 1; 1.84; 3; 20; 22; 920; 25; 200; 27; 10; 2 [IU]/1; UG/1; MG/1; [IU]/1; MG/1; MG/1; MG/1; MG/1; MG/1; [IU]/1; MG/1; MG/1; MG/1; MG/1; MG/1
1 TABLET, FILM COATED ORAL DAILY
COMMUNITY
End: 2022-09-15 | Stop reason: ALTCHOICE

## 2022-07-16 NOTE — ED TRIAGE NOTES
Pt is an alert and oriented female. 30 weeks pregnant. Started lower abdominal pain and pressure started two days ago. No bleeding. No discharge. OB dr. Yamel Bhakta, P0.

## 2022-07-16 NOTE — DISCHARGE INSTRUCTIONS
Patient Education   Patient Education        Weeks 30 to 28 of Your Pregnancy: Care Instructions  Overview     You've made it to the final months of your pregnancy! By now your baby is really starting to look like a baby, with hair and plump skin. As you enter the final weeks of pregnancy, the reality of having a baby may start to set in. This is a good time to set up a safe nursery and find quality  if needed. Doing this stuff ahead of time will allow you to focus on caring for and enjoying your new baby. You may also want to take a tour of your hospital's labor and delivery unit. This will help you get a better idea of what to expect while you're in the hospital.  During these last months, be sure to take good care of yourself. Pay attention to what your body needs. If your doctor says it's okay for you to work, don't push yourself too hard. If you haven't already had the Tdap shot during this pregnancy, talk to your doctor about getting it. It will help protect your  against pertussis infection. Follow-up care is a key part of your treatment and safety. Be sure to make and go to all appointments, and call your doctor if you are having problems. It's also a good idea to know your test results and keep a list of the medicines you take. How can you care for yourself at home? Pay attention to your baby's movements  · You should feel your baby move several times every day. · Your baby now turns less, and kicks and jabs more. · Your baby sleeps 20 to 45 minutes at a time and is more active at certain times of day. · If your doctor wants you to count your baby's kicks:  ? Empty your bladder, and lie on your side or relax in a comfortable chair. ? Write down your start time. ? Pay attention only to your baby's movements. Count any movement except hiccups. ? After you have counted 10 movements, write down your stop time.   ? Write down how many minutes it took for your baby to move 10 times.  ? If an hour goes by and you have not recorded 10 movements, have something to eat or drink and then count for another hour. If you don't record at least 10 movements in the 2-hour period, call your doctor. Ease heartburn  · Eat small, frequent meals. · Do not eat chocolate, peppermint, or very spicy foods. Avoid drinks with caffeine, such as coffee, tea, and sodas. · Avoid bending over or lying down after meals. · Take a short walk after you eat. · If heartburn is a problem at night, do not eat for 2 hours before bedtime. · Take antacids like Mylanta, Maalox, Rolaids, or Tums. Do not take antacids that have sodium bicarbonate. Care for varicose veins  · Varicose veins are blood vessels that stretch out with the extra blood during pregnancy. Your legs may ache or throb. Most varicose veins will go away after the birth. · Avoid standing for long periods of time. Sit with your legs crossed at the ankles, not the knees. · Sit with your feet propped up. · Avoid tight clothing or stockings. Wear support hose. · Exercise regularly. Try walking for at least 30 minutes a day. Where can you learn more? Go to http://www.gray.com/  Enter X471 in the search box to learn more about \"Weeks 30 to 32 of Your Pregnancy: Care Instructions. \"  Current as of: June 16, 2021               Content Version: 13.2  © 7764-0223 [employer]. Care instructions adapted under license by [employer] (which disclaims liability or warranty for this information). If you have questions about a medical condition or this instruction, always ask your healthcare professional. [place] [address] any warranty or liability for your use of this information. Round Ligament Pain: Care Instructions  Your Care Instructions     Round ligament pain is a common pain during pregnancy. You may feel a sharp brief pain on one or both sides of your belly.  It may go down into your groin. It's usually felt for the first time during the second trimester. This pain is a normal part of pregnancy. It will go away as your pregnancy continues or after your baby is born. Your uterus is supported by two ligaments that go from the top and sides of the uterus to the bones of the pelvis. These are the round ligaments. As your uterus grows, these ligaments stretch and tighten with your movements. This may be the cause of the pain. You may find that certain activities seem to cause pain. If you can, avoid those activities. Your doctor can usually diagnose round ligament pain from your symptoms and an exam. If you have bleeding or other symptoms, your doctor may also do an imaging test, such as an ultrasound. Your doctor may suggest that you take an over-the-counter pain medicine, such as acetaminophen. Follow-up care is a key part of your treatment and safety. Be sure to make and go to all appointments, and call your doctor if you are having problems. It's also a good idea to know your test results and keep a list of the medicines you take. How can you care for yourself at home? · If certain movements seem to trigger belly pain, see if you can avoid them while you are pregnant. · Stay active. If your doctor says it's okay, try moderate exercise. Water exercise may be a good choice if you have belly pain. Examples are swimming and water aerobics. · Ask your doctor about taking acetaminophen for pain. Be safe with medicines. Read and follow all instructions on the label. When should you call for help? Call your doctor now or seek immediate medical care if:    · You think you might be in labor.     · You have new or worse pain. Watch closely for changes in your health, and be sure to contact your doctor if you have any problems. Where can you learn more?   Go to http://www.gray.com/  Enter R110 in the search box to learn more about \"Round Ligament Pain: Care Instructions. \"  Current as of: June 16, 2021               Content Version: 13.2  © 1828-5538 Healthwise, [facility]. Care instructions adapted under license by [employer] (which disclaims liability or warranty for this information). If you have questions about a medical condition or this instruction, always ask your healthcare professional. [place] [address] any warranty or liability for your use of this information.

## 2022-07-16 NOTE — PROGRESS NOTES
1750-Pt arrive to unit via wc from ED in stable condition with FOB at side. Instructed on gowning and UA collection. Pt reports lower abdominal pain x2 days noted when moving in bed, or getting out of bed or the car. Pain is exaserbated with fetal movement. Abdomen soft to palpation x4 quad. Rates 8/10 has not tried Tylenol or other non pharmacologic methods of pain relief. Denies LOF, vaginal bleeding. Endorses good fetal movement. 1801-Pt to bed, plan of care discussed, states verbal understanding. TOCO placed and set to baseline.  BPM via EFM. Pt oriented to room and use of call light, personal items within reach. 1837-SBAR to Dr. Eleni Colon, D/C order received, pt to follow up with Dr. Tameka Lema as previously scheduled for 07/20/22.    1643-To bedside, External monitors removed for patient to dress and prepare for d/c home. 1848-D/C instructions and teaching reviewed with pt and FOB, state verbal understanding. Questions answered. Pt to follow up with Dr. Tameka Lema in office as previously scheduled on 07/20/22.     1900-Pt left unit undelivered, ambulatory in stable condition.

## 2022-07-17 PROBLEM — O26.899 PAIN OF ROUND LIGAMENT AFFECTING PREGNANCY, ANTEPARTUM: Status: ACTIVE | Noted: 2022-07-17

## 2022-07-17 PROBLEM — R10.2 PAIN OF ROUND LIGAMENT AFFECTING PREGNANCY, ANTEPARTUM: Status: ACTIVE | Noted: 2022-07-17

## 2022-07-17 NOTE — H&P
History and Physical    Patient: London Welsh MRN: [id number]  SSN: [id number]    YOB: 1995  Age: 27 y.o. Sex: female      Subjective:      London Welsh is a 27 y.o. female G1 at 30+ weeks presents to L and D with c/o pain when walking, rolling over or getting up. Notes good FM, Denies any LOF, or bleeding or PIH sxs    Past Medical History:   Diagnosis Date    Anxiety      History reviewed. No pertinent surgical history. History reviewed. No pertinent family history. Social History     Tobacco Use    Smoking status: Never Smoker    Smokeless tobacco: Never Used   Substance Use Topics    Alcohol use: Not Currently      Prior to Admission medications    Medication Sig Start Date End Date Taking? Authorizing Provider   prenatal vit-calcium-iron-fa (Prenatal Plus, calcium carb,) 27 mg iron- 1 mg tab Take 1 Tablet by mouth daily. Yes Provider, Historical   pyridoxine, vitamin B6, (VITAMIN B-6) 50 mg tablet Take 1 Tablet by mouth daily. Indications: excessive vomiting in pregnancy 2/17/22  Yes Diana Rankin MD        No Known Allergies    Review of Systems:  A comprehensive review of systems was negative except for that written in the History of Present Illness.     Objective:     Vitals:    07/16/22 1742 07/16/22 1802 07/16/22 1815   BP: 126/74 138/72 130/75   Pulse: (!) 112 (!) 114 (!) 107   Resp: 18     Temp: 98 °F (36.7 °C) 99 °F (37.2 °C)    SpO2: 100% 100%    Weight: 245 lb (111.1 kg)     Height: 5' 2\" (1.575 m)          Physical Exam:  GENERAL: alert, cooperative, no distress, appears stated age  Abd: Gravid, NST reactive, no UC's no    Assessment:     Hospital Problems  Date Reviewed: 6/27/2022          Codes Class Noted POA    Pain of round ligament affecting pregnancy, antepartum ICD-10-CM: O26.899, R10.2  ICD-9-CM: 646.83, 625.9  7/17/2022 Unknown        Pregnancy ICD-10-CM: Z34.90  ICD-9-CM: V22.2  7/16/2022 Unknown              Plan:     D/C with office f/up  Tylenol and other alleviating factors DWP    Signed By: Jose Byrne MD     July 17, 2022

## 2022-07-20 ENCOUNTER — ROUTINE PRENATAL (OUTPATIENT)
Dept: OBGYN CLINIC | Age: 27
End: 2022-07-20
Payer: MEDICAID

## 2022-07-20 VITALS
HEIGHT: 62 IN | SYSTOLIC BLOOD PRESSURE: 134 MMHG | WEIGHT: 245 LBS | OXYGEN SATURATION: 98 % | DIASTOLIC BLOOD PRESSURE: 78 MMHG | BODY MASS INDEX: 45.08 KG/M2 | HEART RATE: 107 BPM | RESPIRATION RATE: 16 BRPM

## 2022-07-20 DIAGNOSIS — Z3A.31 31 WEEKS GESTATION OF PREGNANCY: ICD-10-CM

## 2022-07-20 DIAGNOSIS — Z34.90 PRENATAL CARE, ANTEPARTUM: Primary | ICD-10-CM

## 2022-07-20 PROCEDURE — 0502F SUBSEQUENT PRENATAL CARE: CPT | Performed by: OBSTETRICS & GYNECOLOGY

## 2022-07-20 NOTE — PATIENT INSTRUCTIONS
Counting Your Baby's Kicks: Care Instructions  Overview     Counting your baby's kicks is one way your doctor can tell that your baby is healthy. Most women--especially in a first pregnancy--feel their baby move for the first time between 16 and 22 weeks. The movement may feel like flutters rather than kicks. Your baby may move more at certain times of the day. When you are active, you may notice less kicking than when you are resting. At your prenatal visits, your doctor will ask whether the baby is active. In your last trimester, your doctor may ask you to count the number of times you feel your baby move. Follow-up care is a key part of your treatment and safety. Be sure to make and go to all appointments, and call your doctor if you are having problems. It's also a good idea to know your test results and keep a list of the medicines you take. How do you count fetal kicks? A common method of checking your baby's movement is to note the length of time it takes to count ten movements (such as kicks, flutters, or rolls). Pick your baby's most active time of day to count. This may be any time from morning to evening. If you don't feel 10 movements in an hour, have something to eat or drink and count for another hour. If you don't feel at least 10 movements in the 2-hour period, call your doctor. When should you call for help? Call your doctor now or seek immediate medical care if:    You noticed that your baby has stopped moving or is moving much less than normal.   Watch closely for changes in your health, and be sure to contact your doctor if you have any problems. Where can you learn more? Go to http://www.gray.com/  Enter [id number] in the search box to learn more about \"Counting Your Baby's Kicks: Care Instructions. \"  Current as of: June 16, 2021               Content Version: 13.2  © 1727-4186 Healthwise, [employer].    Care instructions adapted under license by [employer] (which disclaims liability or warranty for this information). If you have questions about a medical condition or this instruction, always ask your healthcare professional. [address] any warranty or liability for your use of this information.

## 2022-07-20 NOTE — PROGRESS NOTES
Visit Vitals  /78 (BP 1 Location: Right arm, BP Patient Position: Sitting)   Pulse (!) 107   Resp 16   Ht 5' 2\" (1.575 m)   Wt 245 lb (111.1 kg)   LMP 12/10/2021   SpO2 98%   BMI 44.81 kg/m²     Pt c/o round ligament pain.

## 2022-07-20 NOTE — PROGRESS NOTES
SUBJECTIVE:  Ethel Gutierrez presents today for return prenatal visit. She complains of: no unusual complaints. Patient's medical, obstetrical, social, and family histories; medications; and lab results have been reviewed. REVIEW OF SYSTEMS: A comprehensive review of systems was negative except for that written in the HPI. 31w0d    OBJECTIVE:   Visit Vitals  /78 (BP 1 Location: Right arm, BP Patient Position: Sitting)   Pulse (!) 107   Resp 16   Ht 5' 2\" (1.575 m)   Wt 245 lb (111.1 kg)   LMP 12/10/2021   SpO2 98%   BMI 44.81 kg/m²      Refer to Prenatal Physical for exam findings    ASSESSMENT/PLAN:  Prenatal Education: 3rd trimester topics:  fetal movement monitoring, pre-term labor, and signs and symptoms of PIH  Patient/guardian understands and agrees with the plan of care.

## 2022-07-30 ENCOUNTER — HOSPITAL ENCOUNTER (OUTPATIENT)
Age: 27
Discharge: HOME | End: 2022-07-30
Attending: OBSTETRICS & GYNECOLOGY | Admitting: OBSTETRICS & GYNECOLOGY
Payer: MEDICAID

## 2022-07-30 VITALS
HEART RATE: 92 BPM | TEMPERATURE: 97.7 F | SYSTOLIC BLOOD PRESSURE: 118 MMHG | HEIGHT: 62 IN | RESPIRATION RATE: 18 BRPM | BODY MASS INDEX: 45.08 KG/M2 | WEIGHT: 245 LBS | OXYGEN SATURATION: 100 % | DIASTOLIC BLOOD PRESSURE: 60 MMHG

## 2022-07-30 PROBLEM — R10.2 PAIN OF ROUND LIGAMENT AFFECTING PREGNANCY, ANTEPARTUM: Status: RESOLVED | Noted: 2022-07-17 | Resolved: 2022-07-30

## 2022-07-30 PROBLEM — U07.1 COVID-19: Status: RESOLVED | Noted: 2021-08-31 | Resolved: 2022-07-30

## 2022-07-30 PROBLEM — O26.899 PAIN OF ROUND LIGAMENT AFFECTING PREGNANCY, ANTEPARTUM: Status: RESOLVED | Noted: 2022-07-17 | Resolved: 2022-07-30

## 2022-07-30 PROBLEM — T74.91XA DOMESTIC ABUSE OF ADULT: Status: ACTIVE | Noted: 2022-07-30

## 2022-07-30 PROBLEM — Z3A.32 32 WEEKS GESTATION OF PREGNANCY: Status: ACTIVE | Noted: 2022-07-30

## 2022-07-30 PROCEDURE — 75810000275 HC EMERGENCY DEPT VISIT NO LEVEL OF CARE

## 2022-07-30 PROCEDURE — 99285 EMERGENCY DEPT VISIT HI MDM: CPT

## 2022-07-30 PROCEDURE — 59025 FETAL NON-STRESS TEST: CPT

## 2022-07-30 PROCEDURE — 96372 THER/PROPH/DIAG INJ SC/IM: CPT

## 2022-07-30 PROCEDURE — 74011250636 HC RX REV CODE- 250/636: Performed by: OBSTETRICS & GYNECOLOGY

## 2022-07-30 PROCEDURE — 99213 OFFICE O/P EST LOW 20 MIN: CPT | Performed by: OBSTETRICS & GYNECOLOGY

## 2022-07-30 RX ORDER — BETAMETHASONE SODIUM PHOSPHATE AND BETAMETHASONE ACETATE 3; 3 MG/ML; MG/ML
12 INJECTION, SUSPENSION INTRA-ARTICULAR; INTRALESIONAL; INTRAMUSCULAR; SOFT TISSUE EVERY 24 HOURS
Status: DISCONTINUED | OUTPATIENT
Start: 2022-07-30 | End: 2022-07-30 | Stop reason: HOSPADM

## 2022-07-30 RX ADMIN — BETAMETHASONE SODIUM PHOSPHATE AND BETAMETHASONE ACETATE 12 MG: 3; 3 INJECTION, SUSPENSION INTRA-ARTICULAR; INTRALESIONAL; INTRAMUSCULAR at 13:50
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
## 2022-07-30 NOTE — DISCHARGE INSTRUCTIONS
Notify your doctor if you have any of the following:  -Painful contractions that occur every 5 minutes for greater than one hour  -vaginal discharge or leaking  -If you feel like your water has broken  -If you are not feeling your baby move  -Any other questions or concerns. If it is after hours or the weekend, you can call the [facility] on call OB doctor at  [phone]. **If you are having a medical emergency, go to your nearest emergency department. **      Continue to perform fetal kick counts as instructed by your doctor. You should be drinking 10-14 glasses of water per day to stay properly hydrated.
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
## 2022-07-31 ENCOUNTER — HOSPITAL ENCOUNTER (OUTPATIENT)
Age: 27
Discharge: HOME | End: 2022-07-31
Attending: OBSTETRICS & GYNECOLOGY | Admitting: OBSTETRICS & GYNECOLOGY
Payer: MEDICAID

## 2022-07-31 VITALS
HEART RATE: 110 BPM | BODY MASS INDEX: 45.08 KG/M2 | SYSTOLIC BLOOD PRESSURE: 127 MMHG | WEIGHT: 245 LBS | DIASTOLIC BLOOD PRESSURE: 78 MMHG | HEIGHT: 62 IN | TEMPERATURE: 98.5 F | RESPIRATION RATE: 18 BRPM

## 2022-07-31 PROCEDURE — 74011250636 HC RX REV CODE- 250/636

## 2022-07-31 PROCEDURE — 96372 THER/PROPH/DIAG INJ SC/IM: CPT

## 2022-07-31 PROCEDURE — 99284 EMERGENCY DEPT VISIT MOD MDM: CPT

## 2022-07-31 RX ORDER — BETAMETHASONE SODIUM PHOSPHATE AND BETAMETHASONE ACETATE 3; 3 MG/ML; MG/ML
INJECTION, SUSPENSION INTRA-ARTICULAR; INTRALESIONAL; INTRAMUSCULAR; SOFT TISSUE
Status: COMPLETED
Start: 2022-07-31 | End: 2022-07-31

## 2022-07-31 RX ORDER — BETAMETHASONE SODIUM PHOSPHATE AND BETAMETHASONE ACETATE 3; 3 MG/ML; MG/ML
12 INJECTION, SUSPENSION INTRA-ARTICULAR; INTRALESIONAL; INTRAMUSCULAR; SOFT TISSUE ONCE
Status: COMPLETED | OUTPATIENT
Start: 2022-07-31 | End: 2022-07-31

## 2022-07-31 RX ADMIN — BETAMETHASONE SODIUM PHOSPHATE AND BETAMETHASONE ACETATE 12 MG: 3; 3 INJECTION, SUSPENSION INTRA-ARTICULAR; INTRALESIONAL; INTRAMUSCULAR at 15:57

## 2022-07-31 RX ADMIN — BETAMETHASONE SODIUM PHOSPHATE AND BETAMETHASONE ACETATE 12 MG: 3; 3 INJECTION, SUSPENSION INTRA-ARTICULAR; INTRALESIONAL; INTRAMUSCULAR; SOFT TISSUE at 15:57

## 2022-07-31 NOTE — PROGRESS NOTES
1542 Patient in for second betamethasone shot. No complaints at this time. Patient given button for NST.    1639 Reactive NST witnessed by myself and DR. Ana Maria Govea    1313 Discharge instructions given orally and written with verbal understanding returned Patient to ambulate off unit    1703 Patient ambulated off unit

## 2022-07-31 NOTE — DISCHARGE SUMMARY
Obstetrical Discharge Summary     Name: Bertha Dumont MRN: [id number]  SSN: [id number]    YOB: 1995  Age: 27 y.o. Sex: female      Admit Date: 7/31/2022    Discharge Date: 7/31/2022     Admitting Physician: Rafa De Jesus MD     Attending Physician:  Manan Jade MD     Admission Diagnoses: Pregnancy [Z34.90]    Discharge Diagnoses: This patient has no babies on file. Additional Diagnoses:   Hospital Problems  Date Reviewed: 7/20/2022            Codes Class Noted POA    Pregnancy ICD-10-CM: Z34.90  ICD-9-CM: V22.2  7/16/2022 Unknown        No results found for: RUBELLAEXT, GRBSEXT    No results found for this or any previous visit (from the past 24 hour(s)). Hospital Course: Patient presents for second Betamethasone dose. Nursing staff administered. NST reactive and reassuring. Disposition: Home  Condition: Good    Patient Instructions:   Current Discharge Medication List        CONTINUE these medications which have NOT CHANGED    Details   prenatal vit-calcium-iron-fa (Prenatal Plus, calcium carb,) 27 mg iron- 1 mg tab Take 1 Tablet by mouth daily. pyridoxine, vitamin B6, (VITAMIN B-6) 50 mg tablet Take 1 Tablet by mouth daily. Indications: excessive vomiting in pregnancy  Qty: 90 Tablet, Refills: 0    Associated Diagnoses: Nausea and vomiting during pregnancy             Reference my discharge instructions. No orders of the defined types were placed in this encounter.        Signed By:  Rafa De Jesus MD     July 31, 2022

## 2022-07-31 NOTE — DISCHARGE INSTRUCTIONS
Please keep all appointments.  Please return to labor and delivery for any bleeding, leakage of fluid, decreased fetal movement or contractions lasting longer than one hour with worsening intensity

## 2022-08-02 LAB
BASOPHILS # BLD AUTO: 0 X10E3/UL (ref 0–0.2)
BASOPHILS NFR BLD AUTO: 0 %
EOSINOPHIL # BLD AUTO: 0 X10E3/UL (ref 0–0.4)
EOSINOPHIL NFR BLD AUTO: 0 %
ERYTHROCYTE [DISTWIDTH] IN BLOOD BY AUTOMATED COUNT: 12.7 % (ref 11.7–15.4)
GLUCOSE 1H P 50 G GLC PO SERPL-MCNC: 168 MG/DL (ref 65–139)
HCT VFR BLD AUTO: 34.2 % (ref 34–46.6)
HGB BLD-MCNC: 11 G/DL (ref 11.1–15.9)
IMM GRANULOCYTES # BLD AUTO: 0.1 X10E3/UL (ref 0–0.1)
IMM GRANULOCYTES NFR BLD AUTO: 1 %
LYMPHOCYTES # BLD AUTO: 1.6 X10E3/UL (ref 0.7–3.1)
LYMPHOCYTES NFR BLD AUTO: 10 %
MCH RBC QN AUTO: 28.4 PG (ref 26.6–33)
MCHC RBC AUTO-ENTMCNC: 32.2 G/DL (ref 31.5–35.7)
MCV RBC AUTO: 88 FL (ref 79–97)
MONOCYTES # BLD AUTO: 0.8 X10E3/UL (ref 0.1–0.9)
MONOCYTES NFR BLD AUTO: 5 %
NEUTROPHILS # BLD AUTO: 14.1 X10E3/UL (ref 1.4–7)
NEUTROPHILS NFR BLD AUTO: 84 %
PLATELET # BLD AUTO: 213 X10E3/UL (ref 150–450)
RBC # BLD AUTO: 3.87 X10E6/UL (ref 3.77–5.28)
WBC # BLD AUTO: 16.6 X10E3/UL (ref 3.4–10.8)

## 2022-08-04 ENCOUNTER — ROUTINE PRENATAL (OUTPATIENT)
Dept: OBGYN CLINIC | Age: 27
End: 2022-08-04
Payer: MEDICAID

## 2022-08-04 VITALS
BODY MASS INDEX: 46.93 KG/M2 | DIASTOLIC BLOOD PRESSURE: 86 MMHG | OXYGEN SATURATION: 98 % | HEART RATE: 88 BPM | SYSTOLIC BLOOD PRESSURE: 126 MMHG | WEIGHT: 255 LBS | HEIGHT: 62 IN

## 2022-08-04 DIAGNOSIS — O99.810 ABNORMAL GLUCOSE AFFECTING PREGNANCY: ICD-10-CM

## 2022-08-04 DIAGNOSIS — Z34.90 PRENATAL CARE, ANTEPARTUM: Primary | ICD-10-CM

## 2022-08-04 DIAGNOSIS — O99.210 MATERNAL OBESITY AFFECTING PREGNANCY, ANTEPARTUM: ICD-10-CM

## 2022-08-04 PROCEDURE — 0502F SUBSEQUENT PRENATAL CARE: CPT | Performed by: OBSTETRICS & GYNECOLOGY

## 2022-08-08 ENCOUNTER — TELEPHONE (OUTPATIENT)
Dept: OBGYN CLINIC | Age: 27
End: 2022-08-08

## 2022-08-08 NOTE — TELEPHONE ENCOUNTER
Returned the patient's call and she is questioning when she can come in for her 3 hour glucose test.  States she came in of 08/05/22 and there was no phlebotomist available. Advised her she may come in on Wednesday or she may come in for the lab order and has her testing done at [address] [facility]
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
## 2022-08-15 ENCOUNTER — ROUTINE PRENATAL (OUTPATIENT)
Dept: OBGYN CLINIC | Age: 27
End: 2022-08-15
Payer: MEDICAID

## 2022-08-15 VITALS
HEIGHT: 62 IN | BODY MASS INDEX: 45.64 KG/M2 | RESPIRATION RATE: 18 BRPM | SYSTOLIC BLOOD PRESSURE: 141 MMHG | WEIGHT: 248 LBS | OXYGEN SATURATION: 81 % | DIASTOLIC BLOOD PRESSURE: 77 MMHG | HEART RATE: 97 BPM

## 2022-08-15 DIAGNOSIS — Z87.898 HISTORY OF DOMESTIC VIOLENCE: ICD-10-CM

## 2022-08-15 DIAGNOSIS — Z3A.34 34 WEEKS GESTATION OF PREGNANCY: ICD-10-CM

## 2022-08-15 DIAGNOSIS — Z34.90 PRENATAL CARE, ANTEPARTUM: Primary | ICD-10-CM

## 2022-08-15 DIAGNOSIS — O99.210 MATERNAL OBESITY AFFECTING PREGNANCY, ANTEPARTUM: ICD-10-CM

## 2022-08-15 PROCEDURE — 0502F SUBSEQUENT PRENATAL CARE: CPT | Performed by: OBSTETRICS & GYNECOLOGY

## 2022-08-15 NOTE — PROGRESS NOTES
SUBJECTIVE:  Alexis Miller presents today for return prenatal visit. She complains of: no unusual complaints. +FM no lof no bleeding. Has not completed 3hr GTT - states the  was not available . Scheduled to complete tomorrow    Patient's medical, obstetrical, social, and family histories; medications; and lab results have been reviewed. REVIEW OF SYSTEMS: A comprehensive review of systems was negative except for that written in the HPI. 34w5d    OBJECTIVE:   Visit Vitals  BP (!) 141/77 (BP 1 Location: Right upper arm, BP Patient Position: Sitting)   Pulse 97   Resp 18   Ht 5' 2\" (1.575 m)   Wt 248 lb (112.5 kg)   LMP 12/10/2021   SpO2 (!) 81%   BMI 45.36 kg/m²      Refer to Prenatal Physical for exam findings    ASSESSMENT/PLAN:  Prenatal Education: 3rd trimester topics:  fetal movement monitoring, labor support, post-partum visit, pre-term labor, and signs and symptoms of PIH  Patient/guardian understands and agrees with the plan of care.

## 2022-08-15 NOTE — PATIENT INSTRUCTIONS
Counting Your Baby's Kicks: Care Instructions  Overview     Counting your baby's kicks is one way your doctor can tell that your baby is healthy. Most women--especially in a first pregnancy--feel their baby move for the first time between 16 and 22 weeks. The movement may feel like flutters rather than kicks. Your baby may move more at certain times of the day. When you are active, you may notice less kicking than when you are resting. At your prenatal visits, your doctor will ask whether the baby is active. In your last trimester, your doctor may ask you to count the number of times you feel your baby move. Follow-up care is a key part of your treatment and safety. Be sure to make and go to all appointments, and call your doctor if you are having problems. It's also a good idea to know your test results and keep a list of the medicines you take. How do you count fetal kicks? A common method of checking your baby's movement is to note the length of time it takes to count ten movements (such as kicks, flutters, or rolls). Pick your baby's most active time of day to count. This may be any time from morning to evening. If you don't feel 10 movements in an hour, have something to eat or drink and count for another hour. If you don't feel at least 10 movements in the 2-hour period, call your doctor. When should you call for help? Call your doctor now or seek immediate medical care if:    You noticed that your baby has stopped moving or is moving much less than normal.   Watch closely for changes in your health, and be sure to contact your doctor if you have any problems. Where can you learn more? Go to http://www.gray.com/  Enter [id number] in the search box to learn more about \"Counting Your Baby's Kicks: Care Instructions. \"  Current as of: June 16, 2021               Content Version: 13.2  © 9172-8177 Healthwise, "[employer].    Care instructions adapted under license by [employer] (which disclaims liability or warranty for this information). If you have questions about a medical condition or this instruction, always ask your healthcare professional. [address] any warranty or liability for your use of this information.

## 2022-08-17 ENCOUNTER — TELEPHONE (OUTPATIENT)
Dept: OBGYN CLINIC | Age: 27
End: 2022-08-17

## 2022-08-17 NOTE — TELEPHONE ENCOUNTER
Patient called stating she attempted to do the 3 hour GTT today and threw up after drinking the glucola. States she has a glucose monitor and will check her blood sugars QID and bring her readings to her next appointment with Dr Medardo Orlando.

## 2022-08-24 DIAGNOSIS — O99.810 ABNORMAL GLUCOSE TOLERANCE AFFECTING PREGNANCY, ANTEPARTUM: Primary | ICD-10-CM

## 2022-08-24 NOTE — TELEPHONE ENCOUNTER
Spoke with the patient and advised her per Dr Perfecto Beltran, she may have a hemoglobin A1C. She states she will come in tomorrow after her visit with Dr Perfecto Beltran at the [facility]) office to have it drawn.

## 2022-08-25 ENCOUNTER — ROUTINE PRENATAL (OUTPATIENT)
Dept: OBGYN CLINIC | Age: 27
End: 2022-08-25
Payer: MEDICAID

## 2022-08-25 VITALS
HEIGHT: 62 IN | OXYGEN SATURATION: 98 % | RESPIRATION RATE: 16 BRPM | BODY MASS INDEX: 45.82 KG/M2 | SYSTOLIC BLOOD PRESSURE: 133 MMHG | DIASTOLIC BLOOD PRESSURE: 82 MMHG | WEIGHT: 249 LBS | HEART RATE: 90 BPM

## 2022-08-25 DIAGNOSIS — Z87.898 HISTORY OF DOMESTIC VIOLENCE: ICD-10-CM

## 2022-08-25 DIAGNOSIS — O99.210 MATERNAL OBESITY AFFECTING PREGNANCY, ANTEPARTUM: ICD-10-CM

## 2022-08-25 DIAGNOSIS — Z3A.36 36 WEEKS GESTATION OF PREGNANCY: ICD-10-CM

## 2022-08-25 DIAGNOSIS — Z34.90 PRENATAL CARE, ANTEPARTUM: Primary | ICD-10-CM

## 2022-08-25 DIAGNOSIS — Z11.3 ROUTINE SCREENING FOR STI (SEXUALLY TRANSMITTED INFECTION): ICD-10-CM

## 2022-08-25 PROCEDURE — 0502F SUBSEQUENT PRENATAL CARE: CPT | Performed by: OBSTETRICS & GYNECOLOGY

## 2022-08-25 NOTE — PATIENT INSTRUCTIONS
Counting Your Baby's Kicks: Care Instructions  Overview     Counting your baby's kicks is one way your doctor can tell that your baby is healthy. Most women--especially in a first pregnancy--feel their baby move for the first time between 16 and 22 weeks. The movement may feel like flutters rather than kicks. Your baby may move more at certain times of the day. When you are active, you may notice less kicking than when you are resting. At your prenatal visits, your doctor will ask whether the baby is active. In your last trimester, your doctor may ask you to count the number of times you feel your baby move. Follow-up care is a key part of your treatment and safety. Be sure to make and go to all appointments, and call your doctor if you are having problems. It's also a good idea to know your test results and keep a list of the medicines you take. How do you count fetal kicks? A common method of checking your baby's movement is to note the length of time it takes to count ten movements (such as kicks, flutters, or rolls). Pick your baby's most active time of day to count. This may be any time from morning to evening. If you don't feel 10 movements in an hour, have something to eat or drink and count for another hour. If you don't feel at least 10 movements in the 2-hour period, call your doctor. When should you call for help? Call your doctor now or seek immediate medical care if:    You noticed that your baby has stopped moving or is moving much less than normal.   Watch closely for changes in your health, and be sure to contact your doctor if you have any problems. Where can you learn more? Go to http://www.gray.com/  Enter [id number] in the search box to learn more about \"Counting Your Baby's Kicks: Care Instructions. \"  Current as of: June 16, 2021               Content Version: 13.2  © 6812-1458 [employer], [employer].    Care instructions adapted under license by [employer] (which disclaims liability or warranty for this information). If you have questions about a medical condition or this instruction, always ask your healthcare professional. [address] any warranty or liability for your use of this information.

## 2022-08-25 NOTE — PROGRESS NOTES
SUBJECTIVE:  Meredith Harrison presents today for return prenatal visit. She complains of: no unusual complaints. Vomitted 3hr GTT,denies any possible violence , + fetal movement    Patient's medical, obstetrical, social, and family histories; medications; and lab results have been reviewed. REVIEW OF SYSTEMS: A comprehensive review of systems was negative except for that written in the HPI.    36w1d    OBJECTIVE:   Visit Vitals  /82 (BP 1 Location: Right upper arm, BP Patient Position: Sitting)   Pulse 90   Resp 16   Ht 5' 2\" (1.575 m)   Wt 249 lb (112.9 kg)   LMP 12/10/2021   SpO2 98%   BMI 45.54 kg/m²      Refer to Prenatal Physical for exam findings    ASSESSMENT/PLAN:  Prenatal Education: 3rd trimester topics:  feeding method education, fetal movement monitoring, labor support, pre-term labor, signs of labor- when to go to the hospital & transportation, and signs and symptoms of PIH  Patient/guardian understands and agrees with the plan of care.

## 2022-08-27 LAB
BASOPHILS # BLD AUTO: 0 X10E3/UL (ref 0–0.2)
BASOPHILS NFR BLD AUTO: 0 %
EOSINOPHIL # BLD AUTO: 0.1 X10E3/UL (ref 0–0.4)
EOSINOPHIL NFR BLD AUTO: 1 %
ERYTHROCYTE [DISTWIDTH] IN BLOOD BY AUTOMATED COUNT: 13.2 % (ref 11.7–15.4)
EST. AVERAGE GLUCOSE BLD GHB EST-MCNC: 126 MG/DL
HBA1C MFR BLD: 6 % (ref 4.8–5.6)
HCT VFR BLD AUTO: 35.4 % (ref 34–46.6)
HGB BLD-MCNC: 11 G/DL (ref 11.1–15.9)
HIV 1+2 AB+HIV1 P24 AG SERPL QL IA: NON REACTIVE
IMM GRANULOCYTES # BLD AUTO: 0 X10E3/UL (ref 0–0.1)
IMM GRANULOCYTES NFR BLD AUTO: 0 %
LYMPHOCYTES # BLD AUTO: 1.8 X10E3/UL (ref 0.7–3.1)
LYMPHOCYTES NFR BLD AUTO: 21 %
MCH RBC QN AUTO: 27.4 PG (ref 26.6–33)
MCHC RBC AUTO-ENTMCNC: 31.1 G/DL (ref 31.5–35.7)
MCV RBC AUTO: 88 FL (ref 79–97)
MONOCYTES # BLD AUTO: 0.5 X10E3/UL (ref 0.1–0.9)
MONOCYTES NFR BLD AUTO: 6 %
NEUTROPHILS # BLD AUTO: 6.1 X10E3/UL (ref 1.4–7)
NEUTROPHILS NFR BLD AUTO: 72 %
PLATELET # BLD AUTO: 221 X10E3/UL (ref 150–450)
RBC # BLD AUTO: 4.01 X10E6/UL (ref 3.77–5.28)
RPR SER QL: NON REACTIVE
WBC # BLD AUTO: 8.5 X10E3/UL (ref 3.4–10.8)

## 2022-08-28 LAB
A VAGINAE DNA VAG QL NAA+PROBE: ABNORMAL SCORE
BVAB2 DNA VAG QL NAA+PROBE: ABNORMAL SCORE
C ALBICANS DNA VAG QL NAA+PROBE: NEGATIVE
C GLABRATA DNA VAG QL NAA+PROBE: NEGATIVE
C KRUSEI DNA VAG QL NAA+PROBE: NEGATIVE
C LUSITANIAE DNA VAG QL NAA+PROBE: NEGATIVE
C TRACH DNA VAG QL NAA+PROBE: NEGATIVE
CANDIDA DNA VAG QL NAA+PROBE: NEGATIVE
MEGA1 DNA VAG QL NAA+PROBE: ABNORMAL SCORE
N GONORRHOEA DNA VAG QL NAA+PROBE: NEGATIVE
T VAGINALIS DNA VAG QL NAA+PROBE: NEGATIVE

## 2022-08-29 ENCOUNTER — TELEPHONE (OUTPATIENT)
Dept: OBGYN CLINIC | Age: 27
End: 2022-08-29

## 2022-08-29 DIAGNOSIS — B96.89 BV (BACTERIAL VAGINOSIS): Primary | ICD-10-CM

## 2022-08-29 DIAGNOSIS — N76.0 BV (BACTERIAL VAGINOSIS): Primary | ICD-10-CM

## 2022-08-29 LAB — B-HEM STREP SPEC QL CULT: NEGATIVE

## 2022-08-29 RX ORDER — METRONIDAZOLE 500 MG/1
500 TABLET ORAL 2 TIMES DAILY
Qty: 14 TABLET | Refills: 0 | Status: SHIPPED | OUTPATIENT
Start: 2022-08-29 | End: 2022-09-02

## 2022-09-02 ENCOUNTER — TELEPHONE (OUTPATIENT)
Dept: OBGYN CLINIC | Age: 27
End: 2022-09-02

## 2022-09-02 ENCOUNTER — ROUTINE PRENATAL (OUTPATIENT)
Dept: OBGYN CLINIC | Age: 27
End: 2022-09-02
Payer: MEDICAID

## 2022-09-02 ENCOUNTER — HOSPITAL ENCOUNTER (EMERGENCY)
Age: 27
Discharge: HOME | End: 2022-09-02
Attending: OBSTETRICS & GYNECOLOGY | Admitting: OBSTETRICS & GYNECOLOGY
Payer: MEDICAID

## 2022-09-02 VITALS
SYSTOLIC BLOOD PRESSURE: 133 MMHG | HEART RATE: 78 BPM | OXYGEN SATURATION: 92 % | RESPIRATION RATE: 16 BRPM | TEMPERATURE: 98.4 F | DIASTOLIC BLOOD PRESSURE: 78 MMHG | WEIGHT: 246 LBS | BODY MASS INDEX: 45.27 KG/M2 | HEIGHT: 62 IN

## 2022-09-02 VITALS
DIASTOLIC BLOOD PRESSURE: 70 MMHG | SYSTOLIC BLOOD PRESSURE: 118 MMHG | HEIGHT: 62 IN | BODY MASS INDEX: 45.27 KG/M2 | RESPIRATION RATE: 16 BRPM | WEIGHT: 246 LBS | HEART RATE: 74 BPM | OXYGEN SATURATION: 98 %

## 2022-09-02 DIAGNOSIS — Z3A.37 37 WEEKS GESTATION OF PREGNANCY: ICD-10-CM

## 2022-09-02 DIAGNOSIS — O36.8130 DECREASED FETAL MOVEMENTS IN THIRD TRIMESTER, SINGLE OR UNSPECIFIED FETUS: Primary | ICD-10-CM

## 2022-09-02 LAB
GLUCOSE BLD STRIP.AUTO-MCNC: 87 MG/DL (ref 65–117)
SERVICE CMNT-IMP: NORMAL

## 2022-09-02 PROCEDURE — 0502F SUBSEQUENT PRENATAL CARE: CPT | Performed by: OBSTETRICS & GYNECOLOGY

## 2022-09-02 PROCEDURE — 82962 GLUCOSE BLOOD TEST: CPT

## 2022-09-02 NOTE — PROGRESS NOTES
1750 - Patient complaining of decreased fetal movement, and uncomfortable, crampy contractions around 10 minutes apart. Patient had doctors appointment today and was 3cm, cervix thinned out. Lost mucus plug a couple days ago. No leakage of fluid, losing mucus/pinkish/spotting. Patient ambulated to room and resting comfortably in bed.    1900 - Verbal shift change report given to Andrew Cordoba RN (oncoming nurse) by Maria Sharpe RN (offgoing nurse). Report included the following information SBAR, Kardex, Intake/Output, MAR, and Recent Results.

## 2022-09-02 NOTE — TELEPHONE ENCOUNTER
Late entry:    Contacted the patient regarding her needing to follow up for decreased fetal movement. The patient was advised at her visit to go to L&D. [facility]. Contacted the patient and she stated she was going to [address] [place] [address]. Dr Jaelyn Rodriguez was made aware.

## 2022-09-02 NOTE — PROGRESS NOTES
SUBJECTIVE:  Haseeb Valle presents today for return prenatal visit. She complains of: no unusual complaints , but admits to decreased movement. Patient's medical, obstetrical, social, and family histories; medications; and lab results have been reviewed. REVIEW OF SYSTEMS: A comprehensive review of systems was negative except for that written in the HPI.    37w2d    OBJECTIVE:   Visit Vitals  /70 (BP 1 Location: Right upper arm, BP Patient Position: Sitting)   Pulse 74   Resp 16   Ht 5' 2\" (1.575 m)   Wt 246 lb (111.6 kg)   LMP 12/10/2021   SpO2 98%   BMI 44.99 kg/m²      Refer to Prenatal Physical for exam findings    ASSESSMENT/PLAN:  Prenatal Education: 3rd trimester topics:  fetal movement monitoring, labor support, and pre-term labor, patient sent to LD for NST/ BPP  Patient/guardian understands and agrees with the plan of care.

## 2022-09-02 NOTE — H&P
[address] [address]   Office [phone], Fax [phone]      History & Physical    Name: Vannesa Muniz MRN: [id number]  SSN: [id number]    YOB: 1995  Age: 27 y.o. Sex: female      Subjective:     Reason for Admission:  Pregnancy, decreased fetal movement, and rule-out labor. History of Present Illness: Ms. Dc Olmos is a 27 y.o.  female with an estimated gestational age of 42w2d with Estimated Date of Delivery: 22. Patient complains of decreased fetal movement for 1 days. Pregnancy has been complicated by diabetes - gestational. Patient denies abdominal pain  , contractions, fever, headache , nausea and vomiting, pelvic pressure, right upper quadrant pain  , shortness of breath, swelling, vaginal bleeding , vaginal leaking of fluid , and visual disturbances. OB History    Para Term  AB Living   1             SAB IAB Ectopic Molar Multiple Live Births                    # Outcome Date GA Lbr Nacho/2nd Weight Sex Delivery Anes PTL Lv   1 Current              Past Medical History:   Diagnosis Date    Anxiety     Gestational diabetes     Gestational hypertension      No past surgical history on file. Social History     Occupational History    Not on file   Tobacco Use    Smoking status: Never    Smokeless tobacco: Never   Vaping Use    Vaping Use: Never used   Substance and Sexual Activity    Alcohol use: Not Currently    Drug use: Not Currently     Types: Marijuana    Sexual activity: Yes     Partners: Male     Birth control/protection: None      No family history on file. No Known Allergies  Prior to Admission medications    Medication Sig Start Date End Date Taking? Authorizing Provider   prenatal vit-calcium-iron-fa (Prenatal Plus, calcium carb,) 27 mg iron- 1 mg tab Take 1 Tablet by mouth daily. Yes Provider, Historical   pyridoxine, vitamin B6, (VITAMIN B-6) 50 mg tablet Take 1 Tablet by mouth daily.  Indications: excessive vomiting in pregnancy 22  Yes Dale Joseph MD   metroNIDAZOLE (FLAGYL) 500 mg tablet Take 1 Tablet by mouth two (2) times a day for 7 days. Patient not taking: Reported on 2022  Deshawn Cadet MD        Review of Systems:  A comprehensive review of systems was negative except for that written in the History of Present Illness. Objective:     Vitals:    Vitals:    22 1758 22 1802   BP: 123/71    Pulse: 99    Resp: 16    Temp: 98.4 °F (36.9 °C)    SpO2: 100%    Weight:  246 lb (111.6 kg)   Height:  5' 2\" (1.575 m)      Temp (24hrs), Av.4 °F (36.9 °C), Min:98.4 °F (36.9 °C), Max:98.4 °F (36.9 °C)    BP  Min: 118/70  Max: 123/71     Physical Exam:  Heart: Regular rate and rhythm  Lung: clear to auscultation throughout lung fields, no wheezes, no rales, no rhonchi, and normal respiratory effort     Membranes:  Intact  Uterine Activity:  None  Fetal Heart Rate:  Reactive  Baseline: 145 per minute  Variability: moderate  Accelerations: yes  Decelerations: none       Lab/Data Review:  Recent Results (from the past 24 hour(s))   GLUCOSE, POC    Collection Time: 22  7:52 PM   Result Value Ref Range    Glucose (POC) 87 65 - 117 mg/dL    Performed by Evie Jc        Assessment and Plan:     Ms. Leila Gaines is a 27 y.o.  female with an estimated gestational age of 42w2d who is observed here for decreased fetal movement. Decreased fetal movement: Sent from [facility] for concerns of decreased fetal movement, however patient notes recent activity. NST reactive. Patient can be discharged home. Patient has plans on establishing care with an OB in the same practice that delivers here at [address]: PNL: O+, AB screen negative, Hg fractionation wnl, most recent Hg 11.0. HepB/Tpall/HIV/HepC/G/C: negative. Rubella immune. VCV not done. GDM: 1hr gtt 168, 3hr gtt was vomited up. A1c was obtained instead (6.0). Not currently on any medications.  Ultrasound at [facility] 230 showed EFW of 50th percentile. I have discussed the diagnosis with the patient and the intended plan as seen in the above orders. All questions were answered concerning future plans. I have discussed medication side effects and warnings with the patient as well. Labor precautions discussed, including: Regular painful contractions, lasting for greater than one hour, taking your breath away; any vaginal bleeding; any leakage of fluid; or absent or decreased fetal movement. Call M.D. on call if any of these symptoms or signs occur.       Patient discussed with Dr. Xiang Guzman MD  Family Medicine Resident

## 2022-09-03 NOTE — PROGRESS NOTES
2111 Discharge instructions reviewed with pt, pt verbalized understanding. Opportunity for questions given. 2113 Pt ambulated off the unit with steady gait.

## 2022-09-03 NOTE — PROGRESS NOTES
Patient reports to care due to decreased fetal movement. Please see H&P. This provider reviewed non stress test and provided education. Herman Le reports active fetal movement since being at the hospital. Complains of [place] contractions. Denies leaking of amniotic fluid or heavy vaginal bleeding. Fetal heart rate baseline 130 bpm, moderate variability, accelerations to 150's, no decelerations. Cat I tracing  TOCO: No contractions    Flintjim reports she was told by her provider in the office today she was going to receive an ultrasound. Reviewed ultrasound not indicated due to reactive NST. Requested cervical exam, but patient later declined since the exam will not change plan of care. Carlo Stevenson reports she was diagnosed with gestational diabetes this morning. Failed 1 hour GTT, but unable to complete 3 hour GTT due to emesis. Hgb A1C elevated. Patient denies having glucometer to check glucose levels. Advised to call the office ASAP to inquire about receiving prescription for equipment. Advised low carbohydrate diet and exercise. Reviewed labor precautions and when to return to care. Aware of fetal kick counts. Next prenatal appointment on Friday, 9/09/2022. Discharge home in stable condition. Patient verbalized understanding.     Glenn Melgoza CNM

## 2022-09-03 NOTE — DISCHARGE INSTRUCTIONS
Belly Pain in Pregnancy: Care Instructions  Overview     When you're pregnant, any belly pain can be a worry. You may not want to call your doctor or midwife about every pain you have. But you don't want to miss something that is dangerous for you or your baby. Even if it feels familiar, belly pain can mean something new when you're pregnant. It's important to know when to call your doctor or midwife. It will also help to know how to care for yourself at home when your pain is not caused by anything harmful. When belly pain is more severe or constant, see a doctor or midwife right away. If you're sure your belly pain is a sign of labor, call your doctor or midwife. When belly pain is brief, it's usually a normal part of pregnancy. It might be related to changes in the growing uterus. Or it could be the stretching of ligaments called round ligaments. These ligaments help support the uterus. Round ligament pain can be on either side of your belly. It can also be felt in your hips or groin. Follow-up care is a key part of your treatment and safety. Be sure to make and go to all appointments, and call your doctor if you are having problems. It's also a good idea to know your test results and keep a list of the medicines you take. How can you tell if belly pain is a sign of labor? When belly pain is caused by labor, it can feel like mild or menstrual-like cramps in your lower belly. These cramps are probably contractions. They can happen in your second or third trimester. You may also have:  A steady, dull ache in your lower back, pelvis, or thighs. A feeling of pressure in your pelvis or lower belly. Changes in your vaginal discharge or a sudden release of fluid from the vagina. If you think you are in labor, call your doctor. How can you care for yourself at home? When belly pain is mild and is not a symptom of labor:  Rest until you feel better. Take a warm bath.   Think about what you drink and eat:  Drink plenty of fluids. Choose water and other clear liquids until you feel better. Try eating small, frequent meals. If your stomach is upset, try bland, low-fat foods like plain rice, broiled chicken, toast, and yogurt. Think about how you move if you are having brief pains from stretching of the round ligaments. Try gentle stretching. Move a little more slowly when turning in bed or getting up from a chair, so those ligaments don't stretch quickly. Lean forward a bit if you think you are going to cough or sneeze. When should you call for help? Call 911  anytime you think you may need emergency care. For example, call if:    You have sudden, severe pain in your belly. You have severe vaginal bleeding. You passed out (lost consciousness). You have a seizure. Call your doctor now or seek immediate medical care if:    You have new or worse belly pain or cramping. You have any vaginal bleeding. You have a fever. You have symptoms of preeclampsia, such as:  Sudden swelling of your face, hands, or feet. New vision problems (such as dimness, blurring, or seeing spots). A severe headache. You think that you may be in labor. This means that you've had at least 8 contractions within 1 hour or at least 4 contractions within 20 minutes, even after you change your position and drink fluids. You have symptoms of a urinary tract infection. These may include:  Pain or burning when you urinate. A frequent need to urinate without being able to pass much urine. Pain in the flank, which is just below the rib cage and above the waist on either side of the back. Blood in your urine. Watch closely for changes in your health, and be sure to contact your doctor if you are worried about your or your baby's health. Where can you learn more?   Go to http://www.gray.com/  Enter B275 in the search box to learn more about \"Belly Pain in Pregnancy: Care Instructions. \"  Current as of: June 16, 2021               Content Version: 13.2  © 7216-0934 [employer]. Care instructions adapted under license by [employer] (which disclaims liability or warranty for this information). If you have questions about a medical condition or this instruction, always ask your healthcare professional. [place] [address] any warranty or liability for your use of this information. Week 40 of Your Pregnancy: Care Instructions  Overview     You are near the end of your pregnancy--and you're probably pretty uncomfortable. It may be harder to walk around. Lying down probably isn't comfortable either. You may have trouble getting to sleep or staying asleep. Most babies are born between 40 and 41 weeks. This is a good time to think about packing a bag for the hospital with items you'll need. Then you'll be ready when labor starts. Follow-up care is a key part of your treatment and safety. Be sure to make and go to all appointments, and call your doctor if you are having problems. It's also a good idea to know your test results and keep a list of the medicines you take. How can you care for yourself at home? Learn about breastfeeding  Breastfeeding is best for your baby and good for you. Breast milk has antibodies to help your baby fight infections. If you breastfeed, you may lose weight faster. That's because making milk burns calories. Learning the best ways to hold your baby will make breastfeeding easier. Sometimes breastfeeding can make partners feel left out. If you have a partner, plan how you can care for your baby together. For example, your partner can bathe and diaper the baby. You can snuggle together when you breastfeed. You may want to learn how to use a breast pump and store your milk. If you choose to bottle feed, make the feeding feel like breastfeeding so you can bond with your baby.  Always hold your baby and the bottle. Don't prop bottles or let your baby fall asleep with a bottle. Learn about crying  It's common for babies to cry for 1 to 3 hours a day. Some cry more, and some cry less. Babies don't cry to make you upset or because you're a bad parent. Crying is how your baby communicates. Your baby may be hungry; have gas; need a diaper change; or feel cold, warm, tired, lonely, or tense. Sometimes babies cry for unknown reasons. If you respond to your baby's needs, your baby will learn to trust you. Try to stay calm when your baby cries. Your baby may get more upset if they sense that you are upset. Know how to care for your   Your baby's umbilical cord stump will drop off on its own, usually between 1 and 2 weeks. To care for your baby's umbilical cord area:  Clean the area at the bottom of the cord 2 or 3 times a day. Pay special attention to the area where the cord attaches to the skin. Keep the diaper folded below the cord. Use a damp washcloth or cotton ball to sponge bathe your baby until the stump has come off. Your baby's first dark stool is called meconium. After the meconium is passed, your baby will develop their own bowel pattern. Some babies, especially  babies, have several bowel movements a day. Others have one or two a day, or one every 2 to 3 days.  babies often have loose, yellow stools. Formula-fed babies have more formed stools. If your baby's stools look like little pellets, your baby is constipated. After 2 days of constipation, call your baby's doctor. If your baby will be circumcised, you can care for your baby at home. Gently rinse your baby's penis with warm water after every diaper change. Don't try to remove the film that forms on the penis. This film will go away on its own. Pat dry. Put petroleum ointment, such as Vaseline, on the area of the diaper that will touch your baby's penis. This will keep the diaper from sticking to your baby.   Ask the doctor about giving your baby acetaminophen (Tylenol) for pain. Where can you learn more? Go to http://www.gray.com/  Enter N257 in the search box to learn more about \"Week 37 of Your Pregnancy: Care Instructions. \"  Current as of: June 16, 2021               Content Version: 13.2  © 0487-2803 [employer]. Care instructions adapted under license by [employer] (which disclaims liability or warranty for this information). If you have questions about a medical condition or this instruction, always ask your healthcare professional. [place] [address] any warranty or liability for your use of this information. Jori Chock Contractions: Care Instructions  Your Care Instructions     [place] contractions prepare your uterus for labor. Think of them as a \"warm-up\" exercise that your body does. You may begin to feel them between the 28th and 30th weeks of your pregnancy. But they start as early as the 20th week. [place] contractions usually occur more often during the ninth month. They may go away when you are active and return when you rest. These contractions are like mild contractions of true labor, but they occur less often. (You feel fewer than 8 in an hour.) They don't cause your cervix to open. It may be hard for you to tell the difference between Jori Chock contractions and true labor, especially in your first pregnancy. Follow-up care is a key part of your treatment and safety. Be sure to make and go to all appointments, and call your doctor if you are having problems. It's also a good idea to know your test results and keep a list of the medicines you take. How can you care for yourself at home? Try a warm bath to help relieve muscle tension and reduce pain. Change positions every 30 minutes. Take breaks if you must sit for a long time. Get up and walk around. Drink plenty of water.   Taking short walks may help you feel better. Your doctor needs to check any contractions that are getting stronger or closer together. Where can you learn more? Go to http://www.gray.com/  Enter Z402 in the search box to learn more about \"[place] Contractions: Care Instructions. \"  Current as of: June 16, 2021               Content Version: 13.2  © 2006-2022 [employer]. Care instructions adapted under license by [employer] (which disclaims liability or warranty for this information). If you have questions about a medical condition or this instruction, always ask your healthcare professional. [place] [address] any warranty or liability for your use of this information. Gestational Diabetes Diet: Care Instructions  Overview     Gestational diabetes is a form of diabetes that can happen during pregnancy. It usually goes away after the baby is born. Gestational diabetes occurs when your body does not use insulin properly. Insulin helps sugar enter your cells, where it is used for energy. You may be able to manage your blood sugar while you are pregnant by eating a healthy diet and getting regular exercise. A dietitian or diabetes educator can help you make a food plan. This plan will help manage your blood sugar and provide good nutrition for you and your baby. If diet and exercise don't help keep your blood sugar in target range, you may need diabetes medicine or insulin. Follow-up care is a key part of your treatment and safety. Be sure to make and go to all appointments, and call your doctor if you are having problems. It's also a good idea to know your test results and keep a list of the medicines you take. How can you care for yourself at home? Learn which foods have carbohydrate. Eating too much carbohydrate will cause your blood sugar to go too high. Carbohydrate foods include:  Breads, cereals, pasta, and rice.   Dried beans and starchy vegetables, like corn, peas, and potatoes. Fruits and fruit juice, milk, and yogurt. Candy, table sugar, soda pop, and drinks sweetened with sugar. Learn how much carbohydrate you need at meals and snacks. A dietitian or diabetes educator can teach you how to keep track of how much carbohydrate you eat. Limit foods that have added sugar. This includes candy, desserts, and soda pop. These foods need to be counted as part of your total carbohydrate intake for the day. Do not drink alcohol. Alcohol is not safe for you or your baby. Do not skip meals. Your blood sugar may drop too low if you skip meals and use insulin. Write down what you eat every day. Review your record with your dietitian or diabetes educator to see if you are eating the right amounts of foods. Check your blood sugar first thing in the morning before you eat. Then check your blood sugar 1 to 2 hours after the first bite of each meal (or as your doctor recommends). This will help you see how the food you eat affects your blood sugar. Keep track of these levels. Share the record with your doctor. When should you call for help? Watch closely for changes in your health, and be sure to contact your doctor if:    You have questions about your diet. You often have problems with high or low blood sugar. Where can you learn more? Go to http://www.gray.com/  Enter M291 in the search box to learn more about \"Gestational Diabetes Diet: Care Instructions. \"  Current as of: July 28, 2021               Content Version: 13.2  © 2006-2022 [employer]. Care instructions adapted under license by [employer] (which disclaims liability or warranty for this information). If you have questions about a medical condition or this instruction, always ask your healthcare professional. [place] [address] any warranty or liability for your use of this information.          Learning About Carbohydrate (Carb) Counting and Eating Out When You Have Diabetes  Why plan your meals? Meal planning can be a key part of managing diabetes. Planning meals and snacks with the right balance of carbohydrate, protein, and fat can help you keep your blood sugar at the target level you set with your doctor. You don't have to eat special foods. You can eat what your family eats, including sweets once in a while. But you do have to pay attention to how often you eat and how much you eat of certain foods. You may want to work with a dietitian or a diabetes educator. They can give you tips and meal ideas and can answer your questions about meal planning. This health professional can also help you reach a healthy weight if that is one of your goals. What should you know about eating carbs? Managing the amount of carbohydrate (carbs) you eat is an important part of healthy meals when you have diabetes. Carbohydrate is found in many foods. Learn which foods have carbs. And learn the amounts of carbs in different foods. Bread, cereal, pasta, and rice have about 15 grams of carbs in a serving. A serving is 1 slice of bread (1 ounce), ½ cup of cooked cereal, or 1/3 cup of cooked pasta or rice. Fruits have 15 grams of carbs in a serving. A serving is 1 small fresh fruit, such as an apple or orange; ½ of a banana; ½ cup of cooked or canned fruit; ½ cup of fruit juice; 1 cup of melon or raspberries; or 2 tablespoons of dried fruit. Milk and no-sugar-added yogurt have 15 grams of carbs in a serving. A serving is 1 cup of milk or 3/4 cup (6 oz) of no-sugar-added yogurt. Starchy vegetables have 15 grams of carbs in a serving. A serving is ½ cup of mashed potatoes or sweet potato; 1 cup winter squash; ½ of a small baked potato; ½ cup of cooked beans; or ½ cup cooked corn or green peas. Learn how much carbs to eat each day and at each meal. A dietitian or certified diabetes educator can teach you how to keep track of the amount of carbs you eat.  This is called carbohydrate counting. If you are not sure how to count carbohydrate grams, use the plate method to plan meals. It is a quick way to make sure that you have a balanced meal. It also can help you manage the amount of carbohydrate you eat at meals. Divide your plate by types of foods. Put non-starchy vegetables on half the plate, meat or other protein food on one-quarter of the plate, and a grain or starchy vegetable in the final quarter of the plate. To this you can add a small piece of fruit and 1 cup of milk or yogurt, depending on how many carbs you are supposed to eat at a meal.  Try to eat about the same amount of carbs at each meal. Do not \"save up\" your daily allowance of carbs to eat at one meal.  Proteins have very little or no carbs. Examples of proteins are beef, chicken, turkey, fish, eggs, tofu, cheese, cottage cheese, and peanut butter. How can you eat out and still eat healthy? Learn to estimate the serving sizes of foods that have carbohydrate. If you measure food at home, it will be easier to estimate the amount in a serving of restaurant food. If the meal you order has too much carbohydrate (such as potatoes, corn, or baked beans), ask to have a low-carbohydrate food instead. Ask for a salad or non-starchy vegetables like broccoli, cauliflower, green beans, or peppers. If you eat more carbohydrate at a meal than you had planned, take a walk or do other exercise. This will help lower your blood sugar. What are some tips for eating healthy? Limit saturated fat, such as the fat from meat and dairy products. This is a healthy choice because people who have diabetes are at higher risk of heart disease. So choose lean cuts of meat and nonfat or low-fat dairy products. Use olive or canola oil instead of butter or shortening when cooking. Don't skip meals. Your blood sugar may drop too low if you skip meals and take insulin or certain medicines for diabetes.   Check with your doctor before you drink alcohol. Alcohol can cause your blood sugar to drop too low. Alcohol can also cause a bad reaction if you take certain diabetes medicines. Follow-up care is a key part of your treatment and safety. Be sure to make and go to all appointments, and call your doctor if you are having problems. It's also a good idea to know your test results and keep a list of the medicines you take. Where can you learn more? Go to http://www.hartley.com/  Enter I147 in the search box to learn more about \"Learning About Carbohydrate (Carb) Counting and Eating Out When You Have Diabetes. \"  Current as of: September 8, 2021               Content Version: 13.2  © 2006-2022 Healthwise, Incorporated. Care instructions adapted under license by [employer] (which disclaims liability or warranty for this information). If you have questions about a medical condition or this instruction, always ask your healthcare professional. [address] any warranty or liability for your use of this information.

## 2022-09-07 ENCOUNTER — ANESTHESIA (OUTPATIENT)
Dept: LABOR AND DELIVERY | Age: 27
DRG: 560 | End: 2022-09-07
Payer: MEDICAID

## 2022-09-07 ENCOUNTER — HOSPITAL ENCOUNTER (INPATIENT)
Age: 27
LOS: 3 days | Discharge: HOME | DRG: 560 | End: 2022-09-10
Attending: OBSTETRICS & GYNECOLOGY | Admitting: OBSTETRICS & GYNECOLOGY
Payer: MEDICAID

## 2022-09-07 ENCOUNTER — ANESTHESIA EVENT (OUTPATIENT)
Dept: LABOR AND DELIVERY | Age: 27
DRG: 560 | End: 2022-09-07
Payer: MEDICAID

## 2022-09-07 DIAGNOSIS — M79.89 LEG SWELLING: ICD-10-CM

## 2022-09-07 DIAGNOSIS — G89.18 POSTOPERATIVE PAIN: Primary | ICD-10-CM

## 2022-09-07 PROBLEM — O99.810 ABNORMAL GLUCOSE TOLERANCE IN PREGNANCY: Status: ACTIVE | Noted: 2022-09-07

## 2022-09-07 LAB
ABO + RH BLD: NORMAL
APPEARANCE UR: ABNORMAL
BACTERIA URNS QL MICRO: NEGATIVE /HPF
BASOPHILS # BLD: 0 K/UL (ref 0–0.1)
BASOPHILS NFR BLD: 0 % (ref 0–1)
BILIRUB UR QL: NEGATIVE
BLOOD GROUP ANTIBODIES SERPL: NEGATIVE
COLOR UR: ABNORMAL
DIFFERENTIAL METHOD BLD: ABNORMAL
EOSINOPHIL # BLD: 0.1 K/UL (ref 0–0.4)
EOSINOPHIL NFR BLD: 1 % (ref 0–7)
ERYTHROCYTE [DISTWIDTH] IN BLOOD BY AUTOMATED COUNT: 14.4 % (ref 11.5–14.5)
GLUCOSE UR STRIP.AUTO-MCNC: NEGATIVE MG/DL
HCT VFR BLD AUTO: 40.7 % (ref 35–47)
HGB BLD-MCNC: 12.7 G/DL (ref 11.5–16)
HGB UR QL STRIP: ABNORMAL
IMM GRANULOCYTES # BLD AUTO: 0.1 K/UL (ref 0–0.04)
IMM GRANULOCYTES NFR BLD AUTO: 1 % (ref 0–0.5)
KETONES UR QL STRIP.AUTO: 15 MG/DL
LEUKOCYTE ESTERASE UR QL STRIP.AUTO: NEGATIVE
LYMPHOCYTES # BLD: 2.4 K/UL (ref 0.8–3.5)
LYMPHOCYTES NFR BLD: 24 % (ref 12–49)
MCH RBC QN AUTO: 27.5 PG (ref 26–34)
MCHC RBC AUTO-ENTMCNC: 31.2 G/DL (ref 30–36.5)
MCV RBC AUTO: 88.3 FL (ref 80–99)
MONOCYTES # BLD: 0.9 K/UL (ref 0–1)
MONOCYTES NFR BLD: 9 % (ref 5–13)
MUCOUS THREADS URNS QL MICRO: ABNORMAL /LPF
NEUTS SEG # BLD: 6.8 K/UL (ref 1.8–8)
NEUTS SEG NFR BLD: 65 % (ref 32–75)
NITRITE UR QL STRIP.AUTO: NEGATIVE
NRBC # BLD: 0 K/UL (ref 0–0.01)
NRBC BLD-RTO: 0 PER 100 WBC
PH UR STRIP: 5 [PH]
PLATELET # BLD AUTO: 212 K/UL (ref 150–400)
PMV BLD AUTO: 11.7 FL (ref 8.9–12.9)
PROT UR STRIP-MCNC: >300 MG/DL
RBC # BLD AUTO: 4.61 M/UL (ref 3.8–5.2)
RBC #/AREA URNS HPF: >100 /HPF (ref 0–5)
SP GR UR REFRACTOMETRY: 1.01 (ref 1–1.03)
SPECIMEN EXP DATE BLD: NORMAL
UROBILINOGEN UR QL STRIP.AUTO: 1 EU/DL (ref 0.2–1)
WBC # BLD AUTO: 10.2 K/UL (ref 3.6–11)
WBC URNS QL MICRO: ABNORMAL /HPF (ref 0–4)

## 2022-09-07 PROCEDURE — 99285 EMERGENCY DEPT VISIT HI MDM: CPT

## 2022-09-07 PROCEDURE — 74011250636 HC RX REV CODE- 250/636

## 2022-09-07 PROCEDURE — 74011250636 HC RX REV CODE- 250/636: Performed by: OBSTETRICS & GYNECOLOGY

## 2022-09-07 PROCEDURE — 75410000002 HC LABOR FEE PER 1 HR

## 2022-09-07 PROCEDURE — 81001 URINALYSIS AUTO W/SCOPE: CPT

## 2022-09-07 PROCEDURE — 76060000078 HC EPIDURAL ANESTHESIA

## 2022-09-07 PROCEDURE — 74011250637 HC RX REV CODE- 250/637: Performed by: OBSTETRICS & GYNECOLOGY

## 2022-09-07 PROCEDURE — 85025 COMPLETE CBC W/AUTO DIFF WBC: CPT

## 2022-09-07 PROCEDURE — 65410000002 HC RM PRIVATE OB

## 2022-09-07 PROCEDURE — 74011000250 HC RX REV CODE- 250

## 2022-09-07 PROCEDURE — 75410000000 HC DELIVERY VAGINAL/SINGLE

## 2022-09-07 PROCEDURE — 77030014125 HC TY EPDRL BBMI -B: Performed by: ANESTHESIOLOGY

## 2022-09-07 PROCEDURE — 59400 OBSTETRICAL CARE: CPT | Performed by: OBSTETRICS & GYNECOLOGY

## 2022-09-07 PROCEDURE — 75410000003 HC RECOV DEL/VAG/CSECN EA 0.5 HR

## 2022-09-07 PROCEDURE — 86900 BLOOD TYPING SEROLOGIC ABO: CPT

## 2022-09-07 PROCEDURE — 4A1HXCZ MONITORING OF PRODUCTS OF CONCEPTION, CARDIAC RATE, EXTERNAL APPROACH: ICD-10-PCS | Performed by: OBSTETRICS & GYNECOLOGY

## 2022-09-07 RX ORDER — NALOXONE HYDROCHLORIDE 0.4 MG/ML
0.4 INJECTION, SOLUTION INTRAMUSCULAR; INTRAVENOUS; SUBCUTANEOUS AS NEEDED
Status: DISCONTINUED | OUTPATIENT
Start: 2022-09-07 | End: 2022-09-10 | Stop reason: HOSPADM

## 2022-09-07 RX ORDER — SODIUM CHLORIDE, SODIUM LACTATE, POTASSIUM CHLORIDE, CALCIUM CHLORIDE 600; 310; 30; 20 MG/100ML; MG/100ML; MG/100ML; MG/100ML
125 INJECTION, SOLUTION INTRAVENOUS CONTINUOUS
Status: DISCONTINUED | OUTPATIENT
Start: 2022-09-07 | End: 2022-09-07

## 2022-09-07 RX ORDER — HYDROCORTISONE ACETATE PRAMOXINE HCL 2.5; 1 G/100G; G/100G
CREAM TOPICAL
Status: DISCONTINUED | OUTPATIENT
Start: 2022-09-07 | End: 2022-09-10 | Stop reason: HOSPADM

## 2022-09-07 RX ORDER — SIMETHICONE 80 MG
80 TABLET,CHEWABLE ORAL
Status: DISCONTINUED | OUTPATIENT
Start: 2022-09-07 | End: 2022-09-10 | Stop reason: HOSPADM

## 2022-09-07 RX ORDER — OXYTOCIN/RINGER'S LACTATE 30/500 ML
87.3 PLASTIC BAG, INJECTION (ML) INTRAVENOUS AS NEEDED
Status: DISCONTINUED | OUTPATIENT
Start: 2022-09-07 | End: 2022-09-07

## 2022-09-07 RX ORDER — ONDANSETRON 4 MG/1
4 TABLET, ORALLY DISINTEGRATING ORAL
Status: DISCONTINUED | OUTPATIENT
Start: 2022-09-07 | End: 2022-09-10 | Stop reason: HOSPADM

## 2022-09-07 RX ORDER — ONDANSETRON 2 MG/ML
4 INJECTION INTRAMUSCULAR; INTRAVENOUS
Status: DISCONTINUED | OUTPATIENT
Start: 2022-09-07 | End: 2022-09-07

## 2022-09-07 RX ORDER — BUTORPHANOL TARTRATE 1 MG/ML
1 INJECTION INTRAMUSCULAR; INTRAVENOUS
Status: DISCONTINUED | OUTPATIENT
Start: 2022-09-07 | End: 2022-09-07

## 2022-09-07 RX ORDER — SODIUM CHLORIDE 0.9 % (FLUSH) 0.9 %
5-40 SYRINGE (ML) INJECTION AS NEEDED
Status: DISCONTINUED | OUTPATIENT
Start: 2022-09-07 | End: 2022-09-07

## 2022-09-07 RX ORDER — IBUPROFEN 800 MG/1
800 TABLET ORAL
Status: DISCONTINUED | OUTPATIENT
Start: 2022-09-07 | End: 2022-09-10 | Stop reason: HOSPADM

## 2022-09-07 RX ORDER — BISACODYL 5 MG
5 TABLET, DELAYED RELEASE (ENTERIC COATED) ORAL DAILY PRN
Status: DISCONTINUED | OUTPATIENT
Start: 2022-09-07 | End: 2022-09-10 | Stop reason: HOSPADM

## 2022-09-07 RX ORDER — OXYTOCIN/RINGER'S LACTATE 30/500 ML
10 PLASTIC BAG, INJECTION (ML) INTRAVENOUS AS NEEDED
Status: DISCONTINUED | OUTPATIENT
Start: 2022-09-07 | End: 2022-09-07

## 2022-09-07 RX ORDER — ZOLPIDEM TARTRATE 5 MG/1
5 TABLET ORAL
Status: DISCONTINUED | OUTPATIENT
Start: 2022-09-07 | End: 2022-09-10 | Stop reason: HOSPADM

## 2022-09-07 RX ORDER — DIPHENHYDRAMINE HCL 25 MG
25 CAPSULE ORAL
Status: DISCONTINUED | OUTPATIENT
Start: 2022-09-07 | End: 2022-09-10 | Stop reason: HOSPADM

## 2022-09-07 RX ORDER — SODIUM CHLORIDE 0.9 % (FLUSH) 0.9 %
5-40 SYRINGE (ML) INJECTION EVERY 8 HOURS
Status: DISCONTINUED | OUTPATIENT
Start: 2022-09-07 | End: 2022-09-07

## 2022-09-07 RX ORDER — OXYTOCIN/RINGER'S LACTATE 30/500 ML
PLASTIC BAG, INJECTION (ML) INTRAVENOUS
Status: DISCONTINUED
Start: 2022-09-07 | End: 2022-09-07

## 2022-09-07 RX ORDER — LIDOCAINE HYDROCHLORIDE AND EPINEPHRINE 15; 5 MG/ML; UG/ML
INJECTION, SOLUTION EPIDURAL
Status: SHIPPED | OUTPATIENT
Start: 2022-09-07 | End: 2022-09-07

## 2022-09-07 RX ORDER — OXYCODONE AND ACETAMINOPHEN 5; 325 MG/1; MG/1
1 TABLET ORAL
Status: DISCONTINUED | OUTPATIENT
Start: 2022-09-07 | End: 2022-09-10 | Stop reason: HOSPADM

## 2022-09-07 RX ORDER — OXYTOCIN 10 [USP'U]/ML
INJECTION, SOLUTION INTRAMUSCULAR; INTRAVENOUS
Status: COMPLETED
Start: 2022-09-07 | End: 2022-09-07

## 2022-09-07 RX ORDER — HYDROCORTISONE ACETATE PRAMOXINE HCL 2.5; 1 G/100G; G/100G
CREAM TOPICAL AS NEEDED
Status: DISCONTINUED | OUTPATIENT
Start: 2022-09-07 | End: 2022-09-07 | Stop reason: SDUPTHER

## 2022-09-07 RX ORDER — BUPIVACAINE HYDROCHLORIDE 2.5 MG/ML
INJECTION, SOLUTION EPIDURAL; INFILTRATION; INTRACAUDAL AS NEEDED
Status: DISCONTINUED | OUTPATIENT
Start: 2022-09-07 | End: 2022-09-07 | Stop reason: HOSPADM

## 2022-09-07 RX ADMIN — OXYTOCIN 10 UNITS: 10 INJECTION, SOLUTION INTRAMUSCULAR; INTRAVENOUS at 08:34

## 2022-09-07 RX ADMIN — SODIUM CHLORIDE, POTASSIUM CHLORIDE, SODIUM LACTATE AND CALCIUM CHLORIDE 125 ML/HR: 600; 310; 30; 20 INJECTION, SOLUTION INTRAVENOUS at 07:15

## 2022-09-07 RX ADMIN — IBUPROFEN 800 MG: 800 TABLET, FILM COATED ORAL at 17:58

## 2022-09-07 RX ADMIN — OXYCODONE HYDROCHLORIDE AND ACETAMINOPHEN 1 TABLET: 5; 325 TABLET ORAL at 20:52

## 2022-09-07 RX ADMIN — OXYCODONE HYDROCHLORIDE AND ACETAMINOPHEN 1 TABLET: 5; 325 TABLET ORAL at 09:44

## 2022-09-07 RX ADMIN — BUPIVACAINE HYDROCHLORIDE 5 ML: 2.5 INJECTION, SOLUTION EPIDURAL; INFILTRATION; INTRACAUDAL; PERINEURAL at 08:05

## 2022-09-07 RX ADMIN — LIDOCAINE HYDROCHLORIDE AND EPINEPHRINE 3 ML: 15; 5 INJECTION, SOLUTION EPIDURAL at 07:55

## 2022-09-07 RX ADMIN — OXYCODONE HYDROCHLORIDE AND ACETAMINOPHEN 1 TABLET: 5; 325 TABLET ORAL at 15:47

## 2022-09-07 RX ADMIN — IBUPROFEN 800 MG: 800 TABLET, FILM COATED ORAL at 09:43

## 2022-09-07 RX ADMIN — BUTORPHANOL TARTRATE 1 MG: 1 INJECTION, SOLUTION INTRAMUSCULAR; INTRAVENOUS at 05:47

## 2022-09-07 RX ADMIN — SODIUM CHLORIDE, POTASSIUM CHLORIDE, SODIUM LACTATE AND CALCIUM CHLORIDE 125 ML/HR: 600; 310; 30; 20 INJECTION, SOLUTION INTRAVENOUS at 05:45

## 2022-09-07 NOTE — ANESTHESIA PREPROCEDURE EVALUATION
Relevant Problems   No relevant active problems       Anesthetic History   No history of anesthetic complications            Review of Systems / Medical History  Patient summary reviewed, nursing notes reviewed and pertinent labs reviewed    Pulmonary  Within defined limits                 Neuro/Psych   Within defined limits           Cardiovascular    Hypertension                   GI/Hepatic/Renal  Within defined limits              Endo/Other    Diabetes         Other Findings            Past Medical History:   Diagnosis Date    Anxiety     Gestational diabetes     Gestational hypertension        No past surgical history on file.     Current Outpatient Medications   Medication Instructions    prenatal vit-calcium-iron-fa (Prenatal Plus, calcium carb,) 27 mg iron- 1 mg tab 1 Tablet, Oral, DAILY    pyridoxine (vitamin B6) (VITAMIN B-6) 50 mg, Oral, DAILY       Current Facility-Administered Medications   Medication Dose Route Frequency    hydrocortisone-pramoxine (ANALPRAM-HC) 2.5-1 % (4g) cream   Rectal QID PRN    ibuprofen (MOTRIN) tablet 800 mg  800 mg Oral Q8H PRN    oxyCODONE-acetaminophen (PERCOCET) 5-325 mg per tablet 1 Tablet  1 Tablet Oral Q4H PRN    naloxone (NARCAN) injection 0.4 mg  0.4 mg IntraVENous PRN    ondansetron (ZOFRAN ODT) tablet 4 mg  4 mg Oral Q8H PRN    simethicone (MYLICON) tablet 80 mg  80 mg Oral QID PRN    bisacodyL (DULCOLAX) tablet 5 mg  5 mg Oral DAILY PRN    diphenhydrAMINE (BENADRYL) capsule 25 mg  25 mg Oral Q4H PRN    measles, mumps & rubella Vacc (PF) (M-M-R II) injection 0.5 mL  0.5 mL SubCUTAneous PRIOR TO DISCHARGE    witch hazel-glycerin (TUCKS) 12.5-50 % pads 1 Pad  1 Pad PeriANAL PRN    zolpidem (AMBIEN) tablet 5 mg  5 mg Oral QHS PRN       Patient Vitals for the past 24 hrs:   Temp Pulse Resp BP SpO2   09/07/22 1240 36.6 °C (97.8 °F) 90 18 131/76 96 %   09/07/22 0946 -- (!) 176 -- 119/75 --   09/07/22 0931 -- (!) 152 -- (!) 143/56 --   09/07/22 0921 -- 90 -- 128/73 --   09/07/22 0916 -- -- -- -- 95 %   09/07/22 0907 -- -- -- -- 96 %   09/07/22 0906 -- -- -- -- 94 %   09/07/22 0904 -- -- -- -- 100 %   09/07/22 0900 -- -- -- -- 100 %   09/07/22 0858 -- -- -- -- 94 %   09/07/22 0856 -- -- -- -- 95 %   09/07/22 0851 -- -- -- -- 100 %   09/07/22 0846 -- 86 -- 131/69 99 %   09/07/22 0844 -- -- -- -- 100 %   09/07/22 0843 -- -- -- -- 97 %   09/07/22 0842 -- -- -- -- (!) 84 %   09/07/22 0840 -- -- -- -- 97 %   09/07/22 0835 -- -- -- -- 100 %   09/07/22 0831 -- 85 -- 133/66 --   09/07/22 0830 -- -- -- -- 92 %   09/07/22 0820 -- -- -- -- 100 %   09/07/22 0819 -- -- -- -- 95 %   09/07/22 0818 -- (!) 135 -- (!) 142/106 --   09/07/22 0802 -- -- -- -- 91 %   09/07/22 0801 -- -- -- -- 100 %   09/07/22 0759 -- -- -- -- 100 %   09/07/22 0754 -- -- -- -- 100 %   09/07/22 0750 -- -- -- -- 100 %   09/07/22 0743 -- -- -- -- 99 %   09/07/22 0741 -- -- -- -- 100 %   09/07/22 0738 -- -- -- -- 99 %   09/07/22 0736 -- -- -- -- 100 %   09/07/22 0735 -- -- -- -- 99 %   09/07/22 0732 -- -- -- -- 97 %   09/07/22 0731 -- 80 -- 138/75 --   09/07/22 0727 -- -- -- -- 95 %   09/07/22 0722 -- -- -- -- 100 %   09/07/22 0717 -- -- -- -- 100 %   09/07/22 0716 -- 83 -- 135/82 --   09/07/22 0712 -- -- -- -- 99 %   09/07/22 0707 -- -- -- -- 96 %   09/07/22 0702 -- -- -- -- (!) 89 %   09/07/22 0701 -- 76 -- 132/79 --   09/07/22 0657 -- -- -- -- 94 %   09/07/22 0652 -- -- -- -- 98 %   09/07/22 0647 -- -- -- -- 99 %   09/07/22 0646 -- 87 -- 127/77 --   09/07/22 0642 -- -- -- -- 98 %   09/07/22 0637 -- -- -- -- 99 %   09/07/22 0632 -- -- -- -- 98 %   09/07/22 0631 -- 86 -- (!) 145/87 --   09/07/22 0627 -- -- -- -- 98 %   09/07/22 0622 -- -- -- -- 98 %   09/07/22 0617 -- -- -- -- 98 %   09/07/22 0616 -- 87 -- 124/76 --   09/07/22 0612 -- -- -- -- 99 %   09/07/22 0607 -- -- -- -- 98 %   09/07/22 0602 -- -- -- -- 96 %   09/07/22 0601 -- 78 -- 137/88 --   09/07/22 0557 -- -- -- -- 97 %   09/07/22 0555 -- 83 -- 130/67 --   09/07/22 0552 -- -- -- -- 98 %   09/07/22 0408 -- -- -- -- 100 %   09/07/22 0407 -- -- -- -- 100 %   09/07/22 0401 -- (!) 104 -- (!) 154/90 97 %   09/07/22 0358 -- -- -- -- 99 %   09/07/22 0353 -- -- -- -- 100 %   09/07/22 0348 -- -- -- -- 100 %   09/07/22 0346 -- (!) 107 -- (!) 144/83 --   09/07/22 0343 -- -- -- -- 100 %   09/07/22 0339 -- -- -- -- 100 %   09/07/22 0338 -- -- -- -- 100 %   09/07/22 0333 -- -- -- -- 95 %   09/07/22 0332 -- (!) 116 -- (!) 143/84 --   09/07/22 0331 37.1 °C (98.8 °F) (!) 116 19 (!) 143/84 --       Lab Results   Component Value Date/Time    WBC 10.2 09/07/2022 04:55 AM    HGB 12.7 09/07/2022 04:55 AM    HCT 40.7 09/07/2022 04:55 AM    PLATELET 395 50/48/7042 04:55 AM    MCV 88.3 09/07/2022 04:55 AM     Lab Results   Component Value Date/Time    Sodium 136 01/26/2022 12:47 PM    Potassium 4.3 01/26/2022 12:47 PM    Chloride 106 01/26/2022 12:47 PM    CO2 23 01/26/2022 12:47 PM    Anion gap 7 01/26/2022 12:47 PM    Glucose 77 01/26/2022 12:47 PM    BUN 4 (L) 01/26/2022 12:47 PM    Creatinine 0.74 01/26/2022 12:47 PM    BUN/Creatinine ratio 5 (L) 01/26/2022 12:47 PM    GFR est AA >60 01/26/2022 12:47 PM    GFR est non-AA >60 01/26/2022 12:47 PM    Calcium 9.5 01/26/2022 12:47 PM     No results found for: APTT, PTP, INR, INREXT  Lab Results   Component Value Date/Time    Glucose 77 01/26/2022 12:47 PM    Glucose (POC) 87 09/02/2022 07:52 PM     Physical Exam    Airway  Mallampati: I  TM Distance: 4 - 6 cm  Neck ROM: normal range of motion   Mouth opening: Normal     Cardiovascular    Rhythm: regular  Rate: normal         Dental  No notable dental hx       Pulmonary  Breath sounds clear to auscultation               Abdominal  GI exam deferred       Other Findings            Anesthetic Plan    ASA: 2  Anesthesia type: epidural      Post-op pain plan if not by surgeon: epidural opioid and indwelling epidural catheter      Anesthetic plan and risks discussed with: Patient and Family      Discussed risks and benefits of epidural analgesia with patient/family in the patient holding room. Questions answered to patient/family satisfaction.

## 2022-09-07 NOTE — H&P
History and Physical    Patient: Riri Adamson MRN: [id number]  SSN: [id number]    YOB: 1995  Age: 27 y.o. Sex: female      Subjective:      Riri Adamson is a 27 y.o. female G1 at 38.0 weeks presented with c/o UCs and LOF. Denies any PIH sxs    Past Medical History:   Diagnosis Date    Anxiety     Gestational diabetes     Gestational hypertension      No past surgical history on file. No family history on file. Social History     Tobacco Use    Smoking status: Never    Smokeless tobacco: Never   Substance Use Topics    Alcohol use: Not Currently      Prior to Admission medications    Medication Sig Start Date End Date Taking? Authorizing Provider   prenatal vit-calcium-iron-fa (Prenatal Plus, calcium carb,) 27 mg iron- 1 mg tab Take 1 Tablet by mouth daily. Yes Provider, Historical   pyridoxine, vitamin B6, (VITAMIN B-6) 50 mg tablet Take 1 Tablet by mouth daily. Indications: excessive vomiting in pregnancy 2/17/22  Yes Violet Tejada MD        No Known Allergies    Review of Systems:  A comprehensive review of systems was negative except for that written in the History of Present Illness.     Objective:     Vitals:    09/07/22 0331 09/07/22 0334   BP: (!) 143/84    Pulse: (!) 116    Resp: 19    Temp: 98.8 °F (37.1 °C)    Weight:  246 lb (111.6 kg)   Height:  5' 2\" (1.575 m)        Physical Exam:  GENERAL: alert, cooperative, no distress, appears stated age  Abd: NST reactive , Ucs present  Cx: gross ROM, 3cms/Vtx/-3 on admission    Assessment:     Hospital Problems  Date Reviewed: 9/2/2022            Codes Class Noted POA    Abnormal glucose tolerance in pregnancy ICD-10-CM: O99.810  ICD-9-CM: 648.80  9/7/2022 Unknown        Pregnancy ICD-10-CM: Z34.90  ICD-9-CM: V22.2  7/16/2022 Unknown           Plan:     ASVD  Epidural PRN    Signed By: Starr Aguilar MD     September 7, 2022

## 2022-09-07 NOTE — PROGRESS NOTES
Problem: Vaginal Delivery: Day of Deliver-Laboring  Goal: *Optimal pain control at patient's stated goal  Outcome: Progressing Towards Goal     Problem: Vaginal Delivery: Day of Delivery-Post delivery  Goal: Off Pathway (Use only if patient is Off Pathway)  Outcome: Progressing Towards Goal  Goal: Activity/Safety  Outcome: Progressing Towards Goal  Goal: Consults, if ordered  Outcome: Progressing Towards Goal  Goal: Nutrition/Diet  Outcome: Progressing Towards Goal  Goal: Discharge Planning  Outcome: Progressing Towards Goal  Goal: Medications  Outcome: Progressing Towards Goal  Goal: Treatments/Interventions/Procedures  Outcome: Progressing Towards Goal  Goal: *Vital signs within defined limits  Outcome: Progressing Towards Goal  Goal: *Labs within defined limits  Outcome: Progressing Towards Goal  Goal: *Hemodynamically stable  Outcome: Progressing Towards Goal  Goal: *Optimal pain control at patient's stated goal  Outcome: Progressing Towards Goal  Goal: *Participates in infant care  Outcome: Progressing Towards Goal  Goal: *Demonstrates progressive activity  Outcome: Progressing Towards Goal  Goal: *Tolerating diet  Outcome: Progressing Towards Goal     Problem: Vaginal Delivery: Postpartum Day 1  Goal: Off Pathway (Use only if patient is Off Pathway)  Outcome: Progressing Towards Goal  Goal: Activity/Safety  Outcome: Progressing Towards Goal  Goal: Consults, if ordered  Outcome: Progressing Towards Goal  Goal: Diagnostic Test/Procedures  Outcome: Progressing Towards Goal  Goal: Nutrition/Diet  Outcome: Progressing Towards Goal  Goal: Discharge Planning  Outcome: Progressing Towards Goal  Goal: Medications  Outcome: Progressing Towards Goal  Goal: Treatments/Interventions/Procedures  Outcome: Progressing Towards Goal  Goal: Psychosocial  Outcome: Progressing Towards Goal  Goal: *Vital signs within defined limits  Outcome: Progressing Towards Goal  Goal: *Labs within defined limits  Outcome: Progressing Towards Goal  Goal: *Hemodynamically stable  Outcome: Progressing Towards Goal  Goal: *Optimal pain control at patient's stated goal  Outcome: Progressing Towards Goal  Goal: *Participates in infant care  Outcome: Progressing Towards Goal  Goal: *Demonstrates progressive activity  Outcome: Progressing Towards Goal  Goal: *Performs self perineal care  Outcome: Progressing Towards Goal  Goal: *Appropriate parent-infant bonding  Outcome: Progressing Towards Goal  Goal: *Tolerating diet  Outcome: Progressing Towards Goal  Goal: *Performs self breast care  Outcome: Progressing Towards Goal     Problem: Vaginal Delivery: Postpartum 2  Goal: Off Pathway (Use only if patient is Off Pathway)  Outcome: Progressing Towards Goal  Goal: Activity/Safety  Outcome: Progressing Towards Goal  Goal: Consults, if ordered  Outcome: Progressing Towards Goal  Goal: Nutrition/Diet  Outcome: Progressing Towards Goal  Goal: Discharge Planning  Outcome: Progressing Towards Goal  Goal: Medications  Outcome: Progressing Towards Goal  Goal: Treatments/Interventions/Procedures  Outcome: Progressing Towards Goal  Goal: Psychosocial  Outcome: Progressing Towards Goal     Problem: Vaginal Delivery: Discharge Outcomes  Goal: *Verbalizes name, dosage, time, side effects, and number of days to continue medications  Outcome: Progressing Towards Goal  Goal: *Describes available resources and support systems  Outcome: Progressing Towards Goal  Goal: *No signs and symptoms of infection  Outcome: Progressing Towards Goal  Goal: *Birth certificate information completed  Outcome: Progressing Towards Goal  Goal: *Received and verbalizes understanding of discharge plan and instructions  Outcome: Progressing Towards Goal  Goal: *Vital signs within defined limits  Outcome: Progressing Towards Goal  Goal: *Labs within defined limits  Outcome: Progressing Towards Goal  Goal: *Hemodynamically stable  Outcome: Progressing Towards Goal  Goal: *Optimal pain control at patient's stated goal  Outcome: Progressing Towards Goal  Goal: *Participates in infant care  Outcome: Progressing Towards Goal  Goal: *Demonstrates progressive activity  Outcome: Progressing Towards Goal  Goal: *Appropriate parent-infant bonding  Outcome: Progressing Towards Goal  Goal: *Tolerating diet  Outcome: Progressing Towards Goal     Problem: Falls - Risk of  Goal: *Absence of Falls  Description: Document John Fall Risk and appropriate interventions in the flowsheet.   Outcome: Progressing Towards Goal  Note: Fall Risk Interventions:            Medication Interventions: Teach patient to arise slowly

## 2022-09-07 NOTE — PROGRESS NOTES
1310-Patient sitting in bed, verbalized no needs or concerns at this time. 1425-Patient reports no needs or concerns at this time. 1700-Supplied pt with manual breast pump, assisted with pumping, pt verbalized and demonstrated use. 4273-OLLSACX educated on policy for leaving the unit to go outside, and pt ambulated off unit with s/o anyway.

## 2022-09-07 NOTE — PROGRESS NOTES
1240-Patient received to room 308 from labor and delivery via wheelchair, received report from Kurt and assumed care of pt. Pt ambulated with steady gait to bed. Room orientation and assessment complete. Call device with in reach. 1400-Patient reports no needs or concerns at this time. 1551-Vitals obtained and pain medication given, pt sitting up eating, s/o at bedside, baby in crib.

## 2022-09-07 NOTE — PROGRESS NOTES
4815: pt arrived from home  via wheelchair, accompanied by friend, with complaints of \"leaking\" and contractions, pt instructed to change into hospital and to leave a urine sample. 5704: Pt stated she noted around 0100 that she noticed a \"rush of fluid leaking\" and then the contractions started \"back to back\". Pt denies vaginal bleeding and endorses fetal movement and noticed a \"watery brownish-yellow discharge\"    1840: MD notified about pt's arrival and updated on status of pt. Admit orders received    1803: anesthesia notified about patient wanting to get an epidural and multiple being unsuccessful getting IV.  Anesthesia stated he is on the way    0506: MD notified about patient's recent SVE (4/100%) and multiple nurses being unsuccessful getting an iv and patient wanting an epidural. MD stated he is on the way    21 [phone]: bedside shift report given to oncoming nurse

## 2022-09-07 NOTE — ROUTINE PROCESS
8341- RECEIVED REPORT FROM PREVIOUS SHIFT. POC DISCUSSED WITH PATIENT.     6730- ANESTHESIA MADE AWARE OF NEED FOR EPIDURAL.     7850- DEKIA CRNA AT BEDSIDE.     0000- TIMEOUT FOR EPIDURAL PERFORMED.

## 2022-09-07 NOTE — L&D DELIVERY NOTE
Delivery Note    Obstetrician:  Edwige Stallings MD    Assistant: none    Pre-Delivery Diagnosis: Term pregnancy    Post-Delivery Diagnosis: Living  infant(s)    Intrapartum Event: None    Procedure: Spontaneous vaginal delivery    Epidural: YES    Monitor:  Fetal Heart Tones - External and Uterine Contractions - External    Indications for instrumental delivery: none    Estimated Blood Loss: No data found    Episiotomy: n/a    Laceration(s):  none    Laceration(s) repair: NO    Presentation: Cephalic    Fetal Description: trammell    Fetal Position: Occiput Anterior    Birth Weight:     Birth Length:     Apgar - One Minute: 8    Apgar - Five Minutes: 9    Umbilical Cord: 3 vessels present  Specimens:   Complications:  none           Cord Blood Results:   Information for the patient's :  Joseph Grace Pending [[id number]]   No results found for: PCTABR, PCTDIG, BILI, ABORH   Prenatal Labs:     Lab Results   Component Value Date/Time    ABO/Rh(D) O Positive 2022 04:55 AM        Attending Attestation: I performed the procedure

## 2022-09-08 LAB
GLUCOSE BLD STRIP.AUTO-MCNC: 83 MG/DL (ref 65–100)
PERFORMED BY, TECHID: NORMAL

## 2022-09-08 PROCEDURE — 99284 EMERGENCY DEPT VISIT MOD MDM: CPT

## 2022-09-08 PROCEDURE — 74011250637 HC RX REV CODE- 250/637: Performed by: OBSTETRICS & GYNECOLOGY

## 2022-09-08 PROCEDURE — 82962 GLUCOSE BLOOD TEST: CPT

## 2022-09-08 PROCEDURE — 65410000002 HC RM PRIVATE OB

## 2022-09-08 RX ADMIN — IBUPROFEN 800 MG: 800 TABLET, FILM COATED ORAL at 14:15

## 2022-09-08 RX ADMIN — IBUPROFEN 800 MG: 800 TABLET, FILM COATED ORAL at 03:09

## 2022-09-08 RX ADMIN — OXYCODONE HYDROCHLORIDE AND ACETAMINOPHEN 1 TABLET: 5; 325 TABLET ORAL at 11:58

## 2022-09-08 NOTE — PROGRESS NOTES
Problem: Vaginal Delivery: Day of Deliver-Laboring  Goal: *Optimal pain control at patient's stated goal  Outcome: Progressing Towards Goal     Problem: Vaginal Delivery: Day of Delivery-Post delivery  Goal: Activity/Safety  Outcome: Progressing Towards Goal  Goal: Consults, if ordered  Outcome: Progressing Towards Goal  Goal: Nutrition/Diet  Outcome: Progressing Towards Goal  Goal: Discharge Planning  Outcome: Progressing Towards Goal  Goal: Medications  Outcome: Progressing Towards Goal  Goal: Treatments/Interventions/Procedures  Outcome: Progressing Towards Goal  Goal: *Vital signs within defined limits  Outcome: Progressing Towards Goal  Goal: *Labs within defined limits  Outcome: Progressing Towards Goal  Goal: *Hemodynamically stable  Outcome: Progressing Towards Goal  Goal: *Optimal pain control at patient's stated goal  Outcome: Progressing Towards Goal  Goal: *Participates in infant care  Outcome: Progressing Towards Goal  Goal: *Demonstrates progressive activity  Outcome: Progressing Towards Goal  Goal: *Tolerating diet  Outcome: Progressing Towards Goal     Problem: Vaginal Delivery: Postpartum Day 1  Goal: Activity/Safety  Outcome: Progressing Towards Goal  Goal: Consults, if ordered  Outcome: Progressing Towards Goal  Goal: Diagnostic Test/Procedures  Outcome: Progressing Towards Goal  Goal: Nutrition/Diet  Outcome: Progressing Towards Goal  Goal: Discharge Planning  Outcome: Progressing Towards Goal  Goal: Medications  Outcome: Progressing Towards Goal  Goal: Treatments/Interventions/Procedures  Outcome: Progressing Towards Goal  Goal: Psychosocial  Outcome: Progressing Towards Goal  Goal: *Vital signs within defined limits  Outcome: Progressing Towards Goal  Goal: *Labs within defined limits  Outcome: Progressing Towards Goal  Goal: *Hemodynamically stable  Outcome: Progressing Towards Goal  Goal: *Optimal pain control at patient's stated goal  Outcome: Progressing Towards Goal  Goal: *Participates in infant care  Outcome: Progressing Towards Goal  Goal: *Demonstrates progressive activity  Outcome: Progressing Towards Goal  Goal: *Performs self perineal care  Outcome: Progressing Towards Goal  Goal: *Appropriate parent-infant bonding  Outcome: Progressing Towards Goal  Goal: *Tolerating diet  Outcome: Progressing Towards Goal  Goal: *Performs self breast care  Outcome: Progressing Towards Goal     Problem: Vaginal Delivery: Postpartum 2  Goal: Activity/Safety  Outcome: Progressing Towards Goal  Goal: Consults, if ordered  Outcome: Progressing Towards Goal  Goal: Nutrition/Diet  Outcome: Progressing Towards Goal  Goal: Discharge Planning  Outcome: Progressing Towards Goal  Goal: Medications  Outcome: Progressing Towards Goal  Goal: Treatments/Interventions/Procedures  Outcome: Progressing Towards Goal  Goal: Psychosocial  Outcome: Progressing Towards Goal     Problem: Vaginal Delivery: Discharge Outcomes  Goal: *Verbalizes name, dosage, time, side effects, and number of days to continue medications  Outcome: Progressing Towards Goal  Goal: *Describes available resources and support systems  Outcome: Progressing Towards Goal  Goal: *No signs and symptoms of infection  Outcome: Progressing Towards Goal  Goal: *Birth certificate information completed  Outcome: Progressing Towards Goal  Goal: *Received and verbalizes understanding of discharge plan and instructions  Outcome: Progressing Towards Goal  Goal: *Vital signs within defined limits  Outcome: Progressing Towards Goal  Goal: *Labs within defined limits  Outcome: Progressing Towards Goal  Goal: *Hemodynamically stable  Outcome: Progressing Towards Goal  Goal: *Optimal pain control at patient's stated goal  Outcome: Progressing Towards Goal  Goal: *Participates in infant care  Outcome: Progressing Towards Goal  Goal: *Demonstrates progressive activity  Outcome: Progressing Towards Goal  Goal: *Appropriate parent-infant bonding  Outcome: Progressing Towards Goal  Goal: *Tolerating diet  Outcome: Progressing Towards Goal     Problem: Falls - Risk of  Goal: *Absence of Falls  Description: Document John Fall Risk and appropriate interventions in the flowsheet.   Outcome: Progressing Towards Goal  Note: Fall Risk Interventions:            Medication Interventions: Teach patient to arise slowly                   Problem: Patient Education: Go to Patient Education Activity  Goal: Patient/Family Education  Outcome: Progressing Towards Goal
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
## 2022-09-08 NOTE — PROGRESS NOTES
1900-assumed care of pt. Pt requesting that no one come to bedside for report d/t her wanting to rest, uninterrupted. Report given at nurses station. 1918-no tox results noted on infant. nursery called and made aware that pt refused tox screen at admission. Pt asleep    2100-pt in bed, sleeping. Infant at bedside. 2200-pt sitting up in bed, breastfeeding infant. Pt encouraged to call out when breastfeeding is complete. Pt aware of plan of care for the night. 2255-assessment completed. Pt has no complaints at this time. Infant at bedside. Pt states that she wants to rest for the remainder of the night and will call if she needs anything. Pt aware that nursery will be rounding on infant and that I will round on her at that time so that she will have the least amount of uninterrupted rest as possible. 0145-pt resting in bed, infant by side. No complaints at this time. 0340-pt up in br. No complaints at this time.  Pt given additional blue chux per request
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
## 2022-09-08 NOTE — PROGRESS NOTES
Progress Note    Patient: Marcelo Sanders MRN: [id number]  SSN: [id number]    YOB: 1995  Age: 27 y.o.   Sex: female      Admit Date: 9/7/2022    LOS: 1 day     Subjective:     Patient is without complaints, she reports minimal lochia    Objective:     Vitals:    09/07/22 1551 09/07/22 2047 09/08/22 0028 09/08/22 0753   BP: (!) 141/92 113/78 119/70 118/70   Pulse: 78 89 86 81   Resp: 18 18 16 16   Temp: 98.3 °F (36.8 °C) 98.8 °F (37.1 °C) 97.7 °F (36.5 °C) 98.2 °F (36.8 °C)   SpO2: 97% 100% 98% 99%   Weight:       Height:            Physical Exam:   Fundus is below umbilicus, extremities without clubbing cyanosis or edema    Lab/Data Review:  No new lab    Assessment:     Active Problems:    Pregnancy (7/16/2022)      Abnormal glucose tolerance in pregnancy (9/7/2022)    Postpartum day #1 status post spontaneous vaginal delivery    Plan:     Routine postpartum care    Signed By: Onofre Cota MD     September 8, 2022

## 2022-09-09 ENCOUNTER — APPOINTMENT (OUTPATIENT)
Dept: CT IMAGING | Age: 27
DRG: 560 | End: 2022-09-09
Attending: OBSTETRICS & GYNECOLOGY
Payer: MEDICAID

## 2022-09-09 LAB
BUN SERPL-MCNC: 5 MG/DL (ref 6–20)
CREAT SERPL-MCNC: 0.73 MG/DL (ref 0.55–1.02)

## 2022-09-09 PROCEDURE — 65410000002 HC RM PRIVATE OB

## 2022-09-09 PROCEDURE — 74011250637 HC RX REV CODE- 250/637: Performed by: OBSTETRICS & GYNECOLOGY

## 2022-09-09 PROCEDURE — 84520 ASSAY OF UREA NITROGEN: CPT

## 2022-09-09 PROCEDURE — 36415 COLL VENOUS BLD VENIPUNCTURE: CPT

## 2022-09-09 PROCEDURE — 99024 POSTOP FOLLOW-UP VISIT: CPT | Performed by: OBSTETRICS & GYNECOLOGY

## 2022-09-09 PROCEDURE — 82565 ASSAY OF CREATININE: CPT

## 2022-09-09 RX ORDER — IBUPROFEN 800 MG/1
800 TABLET ORAL
Qty: 30 TABLET | Refills: 0 | Status: SHIPPED | OUTPATIENT
Start: 2022-09-09

## 2022-09-09 RX ORDER — OXYCODONE AND ACETAMINOPHEN 5; 325 MG/1; MG/1
1 TABLET ORAL
Qty: 15 TABLET | Refills: 0 | Status: SHIPPED | OUTPATIENT
Start: 2022-09-09 | End: 2022-09-16

## 2022-09-09 RX ADMIN — MINERAL OIL, PETROLATUM, PHENYLEPHRINE HCL: 14; 74.9; .25 OINTMENT RECTAL at 10:14

## 2022-09-09 RX ADMIN — Medication: at 15:59

## 2022-09-09 NOTE — PROGRESS NOTES
1130: CT called ER  to evaluate the patient's IV and see if they can use it. ER  said the patient refused to let him see it and didn't want the CT anymore. I removed the IV per patient request.     1400: Patient wanted to talk to forensics nurse again. Forensics was called and they talked to the patient.

## 2022-09-09 NOTE — DISCHARGE INSTRUCTIONS
Narcotic-Analgesic/Acetaminophen (Percocet, Norco, Lorcet HD, Lortab 10/325) - (By mouth)   Why this medicine is used:   Relieves pain. Contact a nurse or doctor right away if you have:  Extreme weakness, shallow breathing, slow heartbeat  Severe confusion, lightheadedness, dizziness, fainting  Yellow skin or eyes, dark urine or pale stools  Severe constipation, severe stomach pain, nausea, vomiting, loss of appetite  Sweating or cold, clammy skin     Common side effects:  Mild constipation, nausea, vomiting  Sleepiness, tiredness  Itching, rash  © 2017 [facility] Information is for End User's use only and may not be sold, redistributed or otherwise used for commercial purposes. Ibuprofen (By mouth)   Ibuprofen (eye-bue-PROE-fen)  Treats pain and fever. This medicine is an NSAID. Brand Name(s): Advil, Advil Children's, Advil Liqui-Gels, Advil Migraine, All-Purpose First Aid Kit, Children's Ibuprofen, Children's Motrin, Comfort Pac, Concentrated Motrin Infants' Drops, Equate Ibuprofen Jonatan Strength, Genpril, [facility] Ibuprofen Infants', [facility] Children's Ibuprofen, [facility] Ibuprofen, [facility] Ibuprofen Jonatan Strength   There may be other brand names for this medicine. When This Medicine Should Not Be Used: This medicine is not right for everyone. Do not use if you had an allergic reaction (including asthma) to ibuprofen, aspirin, or another NSAID, or right before or after heart surgery. How to Use This Medicine:   Capsule, Liquid Filled Capsule, Suspension, Tablet, Chewable Tablet  Your doctor will tell you how much medicine to use. Do not use more than directed. Prescription ibuprofen should come with a Medication Guide. Ask your pharmacist for the Medication Guide if you do not have one. Follow the instructions on the medicine label if you are using this medicine without a prescription.   Take this medicine with food or milk if it upsets your stomach. Oral liquid: Shake well just before using. Measure with a marked measuring spoon, oral syringe, or medicine cup. Chewable tablet: Chew completely before you swallow it. Then drink some water to make sure you swallow all of the medicine. For Children: Ask your pharmacist if you are not sure how much medicine to give a child. The dose is usually based on weight, not age. Never give more medicine than directed. For Adults: Do not take more than 6 pills in 1 day (24 hours) unless your doctor tells you to. Missed dose: If you take this medicine on a regular basis and miss a dose, take it as soon as you can. If it is almost time for your next dose, wait until then to use the medicine and skip the missed dose. Do not use extra medicine to make up for a missed dose. Store the medicine in a closed container at room temperature, away from heat, moisture, and direct light. Do not freeze the oral liquid. Drugs and Foods to Avoid:   Ask your doctor or pharmacist before using any other medicine, including over-the-counter medicines, vitamins, and herbal products. Some foods and medicine can affect how ibuprofen works. Tell your doctor if you are also using lithium, methotrexate, a blood thinner (such as warfarin), a steroid medicine (such as hydrocortisone, prednisolone, prednisone), a diuretic (water pill), or an ACE inhibitor blood pressure medicine. Do not use any other NSAID medicine unless your doctor says it is okay. Some other NSAIDs are aspirin, diclofenac, naproxen, or celecoxib. Do not drink alcohol while you are using this medicine. Warnings While Using This Medicine:   Tell your doctor if you are pregnant or breastfeeding. Do not use this medicine during the later part of pregnancy. Tell your doctor if you have kidney disease, liver disease, asthma, lupus or a similar connective tissue disease, or a history of ulcers or other digestion problems.  Tell your doctor if you smoke or have heart or blood circulation problems, including high blood pressure, heart failure (CHF), or bleeding problems. This medicine may cause the following problems:  Bleeding and ulcers in the stomach or intestines  Higher risk of heart attack or stroke  Liver damage  Kidney damage  Vision problems  Call your doctor if symptoms get worse, pain lasts more than 10 days, or fever lasts more than 3 days. This medicine might contain sugar or phenylalanine (aspartame). Tell any doctor or dentist who treats you that you are using this medicine. Keep all medicine out of the reach of children. Never share your medicine with anyone. Possible Side Effects While Using This Medicine:   Call your doctor right away if you notice any of these side effects: Allergic reaction: Itching or hives, swelling in your face or hands, swelling or tingling in your mouth or throat, chest tightness, trouble breathing  Blistering, peeling, or red skin rash  Change in how much or how often you urinate  Chest pain that may spread to your arms, jaw, back, or neck, trouble breathing, nausea, unusual sweating, faintness  Chest pain, trouble breathing, weakness on one side of your body, severe headache, trouble seeing or talking, pain in your lower leg  Dark urine or pale stools, nausea, vomiting, loss of appetite, stomach pain, yellow skin or eyes  Fever, neck pain, stiff neck  Severe stomach pain, vomiting blood, bloody or black, tarry stools  Swelling in your hands, ankles, or feet, rapid weight gain  Trouble seeing, blind spots, change in how you see colors  Unusual bleeding, bruising, or weakness  If you notice these less serious side effects, talk with your doctor:   Constipation, diarrhea, gas, mild upset stomach  Dizziness, headache, ringing in the ears  If you notice other side effects that you think are caused by this medicine, tell your doctor. Call your doctor for medical advice about side effects.  You may report side effects to FDA at 1-800-FDA-1088  © 2017 [employer] Information is for End User's use only and may not be sold, redistributed or otherwise used for commercial purposes. The above information is an  only. It is not intended as medical advice for individual conditions or treatments. Talk to your doctor, nurse or pharmacist before following any medical regimen to see if it is safe and effective for you. Vaginal Childbirth: Care Instructions  Overview     Vaginal birth means delivering a baby through the birth canal (vagina). During labor, the uterus tightens (contracts) regularly to thin and open the cervix and to push the baby out through the birth canal.  Your body will slowly heal in the next few weeks. It's easy to get too tired and overwhelmed during the first weeks after your baby is born. Changes in your hormones can shift your mood without warning. You may find it hard to meet the extra demands on your energy and time. Take it easy on yourself. Follow-up care is a key part of your treatment and safety. Be sure to make and go to all appointments, and call your doctor if you are having problems. It's also a good idea to know your test results and keep a list of the medicines you take. How can you care for yourself at home? Vaginal bleeding and cramps  After delivery, you will have a bloody discharge from your vagina. This will turn pink within a week and then white or yellow after about 10 days. It may last for 2 to 4 weeks or longer, until the uterus has healed. Use sanitary pads until you stop bleeding. Using pads makes it easier to monitor your bleeding. Don't worry if you pass some blood clots, as long as they are smaller than a golf ball. If you have a tear or stitches in your vaginal area, change the pad at least every 4 hours. This will help prevent soreness and infection. You may have cramps for the first few days after childbirth.  These are normal and occur as the uterus shrinks to normal size. Take an over-the-counter pain medicine, such as acetaminophen (Tylenol), ibuprofen (Advil, Motrin), or naproxen (Aleve), for cramps. Read and follow all instructions on the label. Do not take aspirin, because it can cause more bleeding. Do not take two or more pain medicines at the same time unless the doctor told you to. Many pain medicines have acetaminophen, which is Tylenol. Too much acetaminophen (Tylenol) can be harmful. Stitches  If you have stitches, they will dissolve on their own and don't need to be removed. Follow your doctor's instructions for cleaning the stitched area. Put ice or a cold pack on your painful area for 10 to 20 minutes at a time, several times a day, for the first few days. Put a thin cloth between the ice and your skin. Sit in a few inches of warm water (sitz bath) 3 times a day and after bowel movements. The warm water helps with pain and itching. If you don't have a tub, a warm shower might help. Breast fullness  Your breasts may overfill (engorge) in the first few days after delivery. To help milk flow and to relieve pain, warm your breasts in the shower or by using warm, moist towels before nursing. If you aren't nursing, don't put warmth on your breasts or touch your breasts. Wear a bra that fits well and use ice until the fullness goes away. This usually takes 2 to 3 days. Put ice or a cold pack on your breast after nursing to reduce swelling and pain. Put a thin cloth between the ice and your skin. Activity  Eat a balanced diet. Don't try to lose weight by cutting calories. Keep taking your prenatal vitamins, or take a multivitamin. Get as much rest as you can. Try to take naps when your baby sleeps during the day. Get some exercise every day. But don't do any heavy exercise until your doctor says it is okay. Wait until you are healed (about 4 to 6 weeks) before you have sexual intercourse. Your doctor will tell you when it is okay to have sex.   If you don't want to get pregnant, talk to your doctor about birth control. You can get pregnant even before your period returns. Also, you can get pregnant while you are breastfeeding. Mental health  It's normal to have some sadness, anxiety, sleeplessness, and mood swings after you go home. If you feel upset or hopeless for more than a few days or are having trouble doing the things you need to do, talk to your doctor. Constipation and hemorrhoids  Drink plenty of fluids. If you have kidney, heart, or liver disease and have to limit fluids, talk with your doctor before you increase the amount of fluids you drink. Eat plenty of fiber each day. Have a bran muffin or bran cereal for breakfast. Try eating a piece of fruit for a mid-afternoon snack. For painful, itchy hemorrhoids, put ice or a cold pack on the area several times a day for 10 minutes at a time. Follow this by putting a warm compress on the area for another 10 to 20 minutes or by sitting in a shallow, warm bath. When should you call for help? Share this information with your partner, family, or a friend. They can help you watch for warning signs. Call 911  anytime you think you may need emergency care. For example, call if:    You have thoughts of harming yourself, your baby, or another person. You passed out (lost consciousness). You have chest pain, are short of breath, or cough up blood. You have a seizure. Call your doctor now or seek immediate medical care if:    You have signs of hemorrhage (too much bleeding), such as:  Heavy vaginal bleeding. This means that you are soaking through one or more pads in an hour. Or you pass blood clots bigger than an egg. Feeling dizzy or lightheaded, or you feel like you may faint. Feeling so tired or weak that you cannot do your usual activities. A fast or irregular heartbeat. New or worse belly pain. You have signs of infection, such as:  A fever. Vaginal discharge that smells bad.   New or worse belly pain. You have symptoms of a blood clot in your leg (called a deep vein thrombosis), such as:  Pain in the calf, back of the knee, thigh, or groin. Redness and swelling in your leg or groin. You have signs of preeclampsia, such as:  Sudden swelling of your face, hands, or feet. New vision problems (such as dimness, blurring, or seeing spots). A severe headache. Watch closely for changes in your health, and be sure to contact your doctor if:    Your vaginal bleeding isn't decreasing. You feel sad, anxious, or hopeless for more than a few days. You are having problems with your breasts or breastfeeding. Where can you learn more? Go to http://antony-antonia.info/  Enter Q237 in the search box to learn more about \"Vaginal Childbirth: Care Instructions. \"  Current as of: June 16, 2021               Content Version: 13.2  © 2006-2022 [employer]. Care instructions adapted under license by [employer] (which disclaims liability or warranty for this information). If you have questions about a medical condition or this instruction, always ask your healthcare professional. [place] [address] any warranty or liability for your use of this information. Breastfeeding: Care Instructions  Overview     Breastfeeding has many benefits. It may lower your baby's chances of getting an infection. It also may make it less likely that your baby will have problems such as diabetes and obesity later in life. Breastfeeding also helps you bond with your baby. In the first days after birth, your breasts make a thick, yellow liquid called colostrum. This liquid gives your baby nutrients and antibodies against infection. It is all that babies need in the first days after birth. Your breasts will fill with milk a few days after the birth. Breastfeeding is a skill that gets better with practice. Be patient with yourself and your baby.  If you have trouble, you can get help and keep breastfeeding. Follow-up care is a key part of your treatment and safety. Be sure to make and go to all appointments, and call your doctor if you are having problems. It's also a good idea to know your test results and keep a list of the medicines you take. How can you care for yourself at home? Breastfeed your baby whenever your baby is hungry. In the first 2 weeks, your baby will breastfeed at least 8 times in a 24-hour period. This will help you keep up your supply of milk. Signs that your baby is hungry include:  Sucking on their hands. [place] their lips. Turning their head toward your breast.  Put a bed pillow or a nursing pillow on your lap to support your arms and your baby. Hold your baby in a comfortable position. You can hold your baby in several ways. One of the most common positions is the cradle hold. One arm supports your baby, with your baby's head in the bend of your elbow. Your open hand supports your baby's bottom or back. Your baby's belly lies against yours. If you had your baby by , or , try the football hold. This position keeps your baby off your belly. Tuck your baby under your arm, with your baby's body along the side you will be feeding on. Support your baby's upper body with your arm. With that hand you can control your baby's head to bring their mouth to your breast.  Try different positions with each feeding. If you are having problems, ask for help from your doctor or a lactation consultant. To get your baby to latch on:  Support and narrow your breast with one hand using a \"U hold,\" with your thumb on the outer side of your breast and your fingers on the inner side. You can also use a \"C hold,\" with all your fingers below the nipple and your thumb above it. Try the different holds to get the deepest latch for whichever breastfeeding position you use.  Your other arm is behind your baby's back, with your hand supporting the base of the baby's head. Position your fingers and thumb to point toward your baby's ears. You can touch your baby's lower lip with your nipple to get your baby's mouth to open. Wait until your baby opens up really wide, like a big yawn. Then be sure to bring the baby quickly to your breast--not your breast to the baby. As you bring your baby toward your breast, use your other hand to support the breast and guide it into your baby's mouth. Both the nipple and a large portion of the darker area around the nipple (areola) should be in the baby's mouth. The baby's lips should be flared outward, not folded in (inverted). Listen for a regular sucking and swallowing pattern while the baby is feeding. If you can't see or hear a swallowing pattern, watch the baby's ears. They will wiggle slightly when the baby swallows. If the baby's nose appears to be blocked by your breast, bring your baby's body closer to you. This will help tilt the baby's head back slightly, so just the edge of one nostril is clear for breathing. When your baby is latched, you can usually remove your hand from supporting your breast and use it to cradle under your baby. Now just relax and breastfeed your baby. You will know that your baby is feeding well when: Your baby's mouth covers a lot of the areola, and the lips are flared out. Your baby's chin and nose rest against your breast.  Sucking is deep and rhythmic, with short pauses. You are able to see and hear your baby swallowing. You do not feel pain in your nipple. Offer both breasts to your baby at each feeding. Each time you breastfeed, switch which breast you start with. Anytime you need to remove your baby from the breast, put one finger in the corner of your baby's mouth. Push your finger between your baby's gums to gently break the seal. If you don't break the tight seal before you remove your baby, your nipples can become sore, cracked, or bruised.   After you finish feeding, gently pat your baby's back to let out any swallowed air. After your baby burps, offer the breast again, or offer the other breast. Sometimes a baby will want to keep feeding after being burped. When should you call for help? Call your doctor now or seek immediate medical care if:    You have symptoms of a breast infection, such as: Increased pain, swelling, redness, or warmth around a breast.  Red streaks extending from the breast.  Pus draining from a breast.  A fever. Your baby has no wet diapers for 6 hours. Watch closely for changes in your health, and be sure to contact your doctor if:    Your baby has trouble latching on to your breast.     You continue to have pain or discomfort when breastfeeding. You have other questions or concerns. Where can you learn more? Go to http://www.gray.com/  Enter P492 in the search box to learn more about \"Breastfeeding: Care Instructions. \"  Current as of: June 16, 2021               Content Version: 13.2  © 2006-2022 [employer]. Care instructions adapted under license by [employer] (which disclaims liability or warranty for this information). If you have questions about a medical condition or this instruction, always ask your healthcare professional. [place] [address] any warranty or liability for your use of this information. Bottle-Feeding: Care Instructions  Overview    Your reasons for wanting to bottle-feed your baby with formula are personal. You and your partner can make the best decision for you and your baby. Formulas can provide all the calories and nutrients your baby needs in the first 6 months of life. Several types of formulas are available. Most babies start with a cow's milk-based formula. Talk to your doctor before trying other types of formulas, which include soy and lactose-free formulas.  At first, preparing the bottles and formula can seem confusing, but it gets easier and faster with some practice. Your  baby probably will want to eat every 2 to 3 hours. Do not worry about the exact timing for the first few weeks, but feed your baby whenever he or she is hungry. In general, your baby should not go longer than 4 hours without eating during the day for the first few months. Sit in a comfortable chair with your arms supported on pillows. Look into your baby's eyes and talk or sing while you are giving the bottle. Enjoy this special time you have with your baby. Follow-up care is a key part of your child's treatment and safety. Be sure to make and go to all appointments, and call your doctor if your child is having problems. It's also a good idea to know your child's test results and keep a list of the medicines your child takes. At each well-baby visit, talk to your doctor about your baby's nutritional needs, which change as he or she grows and develops. How can you care for yourself at home? Prepare your supplies for bottle-feeding before your baby is born, if possible. Have a supply of small bottles (usually 4 ounces) for your baby's first few weeks. You may want to buy a variety of bottle nipples so you can see which type your baby likes. Before using bottles and nipples the first time, wash them in hot water and dish soap and rinse with hot water. Ask your doctor which formula to use. You can buy formula as a liquid concentrate or a powder that you mix with water. Formulas also come in a ready-to-feed form. Always use formula with added iron unless the doctor says not to. Make sure you have clean, safe water to mix with the formula. If you are not sure if your water is safe, you can use bottled water or you can boil tap water. Boil cold tap water for 1 minute, then cool the water to room temperature. Use the boiled water to mix the formula within 30 minutes. Wash your hands before preparing formula.   Read the label to see how much water to mix with the formula. If you add too little water, it can upset your baby's stomach. If you add too much water, your baby will not get the right nutrition. Cover the prepared formula and store it in a refrigerator. Use it within 24 hours. Soak dirty baby bottles in water and dish soap. Wash bottles and nipples in the upper rack of the  or hand-wash them in hot water with dish soap. To bottle-feed your baby  Warm the formula to room temperature or body temperature before feeding. The best way to warm it is in a bowl of heated water. Do not use a microwave, which can cause hot spots in the formula that can burn your baby's mouth. Before feeding your baby, check the temperature of the formula by dripping 2 or 3 drops on the inside of your wrist. It should be warm, not cold or hot. Place a bib or cloth under your baby's chin to help keep clothes clean. Have a second cloth handy to use when burping your baby. Support your baby with one arm, with your baby's head resting in the bend of your elbow. Keep your baby's head higher than his or her chest.  Stroke the center of your baby's lower lip to encourage the mouth to open wider. A wide mouth will cover more of the nipple and will help reduce the amount of air the baby sucks in. Angle the bottle so the neck of the bottle and the nipple stay full of milk. This helps reduce how much air your baby swallows. Do not prop the bottle in your baby's mouth or let him or her hold it alone. This increases your baby's chances of choking or getting ear infections. During the first few weeks, burp your baby after every 2 ounces of formula. This helps get rid of swallowed air and reduces spitting up. You will know your baby is full when he or she stops sucking. Your baby may spit out the nipple, turn his or her head away, or fall asleep when full.  babies usually drink from 1 to 3 ounces each feeding. Throw away any formula left in the bottle after you have fed your baby. Bacteria can grow in the leftover formula. It can be helpful to hold your baby upright for about 30 minutes after eating to reduce spitting up. When should you call for help? Watch closely for changes in your child's health, and be sure to contact your doctor if:    Your child does not seem to be growing and gaining weight. Your child has trouble passing stools, or his or her stools are hard and dry. Your child is vomiting. Your child has diarrhea or a skin rash. Your child cries most of the time. Your child has gas, bloating, or cramps after drinking a bottle. Where can you learn more? Go to http://www.gray.com/  Enter P111 in the search box to learn more about \"Bottle-Feeding: Care Instructions. \"  Current as of: September 8, 2021               Content Version: 13.2  © 5717-5384 [employer]. Care instructions adapted under license by [employer] (which disclaims liability or warranty for this information). If you have questions about a medical condition or this instruction, always ask your healthcare professional. [place] [id number] any warranty or liability for your use of this information. Depression After Childbirth: Care Instructions  Overview     Many women get the \"baby blues\" during the first few days after childbirth. You may lose sleep, feel irritable, and cry easily. You may feel happy one minute and sad the next. Hormone changes are one cause of these emotional changes. Also, the demands of a new baby, along with visits from relatives or other family needs, can add to the stress. The \"baby blues\" often peak around the fourth day. Then they ease up in less than 2 weeks. If your moodiness or anxiety lasts for more than 2 weeks, or if you feel like life is not worth living, you may have postpartum depression. This is different for each person.  Some mothers with serious depression may worry intensely about their infant's well-being. Others may feel distant from their child. Some mothers may even feel that they might harm their baby. Some may have signs of paranoia, wondering if someone is watching them. Depression is not a sign of weakness. It's a medical condition that requires treatment. Medicine and counseling often work well to reduce depression. Talk to your doctor about taking antidepressant medicine while breastfeeding. Follow-up care is a key part of your treatment and safety. Be sure to make and go to all appointments, and call your doctor if you are having problems. It's also a good idea to know your test results and keep a list of the medicines you take. How do you know if you are depressed? With all the changes in your life, you may not know if you are depressed. Pregnancy sometimes causes changes in how you feel that are similar to the symptoms of depression. Symptoms of depression include:  Feeling sad or hopeless and losing interest in daily activities. These are the most common symptoms of depression. Sleeping too much or not enough. Feeling tired. You may feel as if you have no energy. Eating too much or too little. Writing or talking about death, such as writing suicide notes or talking about guns, knives, or pills. Keep the numbers for these national suicide hotlines: 4-084-659-TALK (8-604.987.2760) and 1-401-JMDMCON (3-879.861.2376). If you or someone you know talks about suicide or feeling hopeless, get help right away. How can you care for yourself at home? Be safe with medicines. Take your medicines exactly as prescribed. Call your doctor if you think you are having a problem with your medicine. Eat a healthy diet so that you can keep up your energy. Get regular daily exercise, such as walks, to help improve your mood. Get as much sunlight as possible. Keep your shades and curtains open. Get outside as much as you can. Avoid using alcohol or other substances to feel better.   Get as much rest and sleep as possible. Avoid doing too much. Being too tired can increase depression. Play stimulating music throughout your day and soothing music at night. Schedule outings and visits with friends and family. Ask them to call you regularly, so that you don't feel alone. Ask for help with preparing food and other daily tasks. Family and friends are often happy to help with a . Be honest with yourself and those who care about you. Tell them about your struggle. Join a support group of new mothers. No one can better understand the challenges of caring for a  than other new mothers. If you feel like life is not worth living or you're feeling hopeless, get help right away. Keep the numbers for these national suicide hotlines: 7-420-066-TALK (7-391.871.1089) and 9-587-WGDCAJR (1-489.514.6879). When should you call for help? Call 911 anytime you think you may need emergency care. For example, call if:    You feel you cannot stop from hurting yourself, your baby, or someone else. Call your doctor now or seek immediate medical care if:    You are having trouble caring for yourself or your baby. You hear voices. Watch closely for changes in your health, and be sure to contact your doctor if:    You have problems with your depression medicine. You do not get better as expected. Where can you learn more? Go to http://www.gray.com/  Enter [id number] in the search box to learn more about \"Depression After Childbirth: Care Instructions. \"  Current as of: 2021               Content Version: 13.2  © 4499-9501 Healthwise, [employer]. Care instructions adapted under license by [employer] (which disclaims liability or warranty for this information).  If you have questions about a medical condition or this instruction, always ask your healthcare professional. [address] any warranty or liability for your use of this information. Constipation: Care Instructions  Overview     Constipation means that you have a hard time passing stools (bowel movements). People pass stools from 3 times a day to once every 3 days. What is normal for you may be different. Constipation may occur with pain in the rectum and cramping. The pain may get worse when you try to pass stools. Sometimes there are small amounts of bright red blood on toilet paper or the surface of stools. This is because of enlarged veins near the rectum (hemorrhoids). A few changes in your diet and lifestyle may help you avoid ongoing constipation. Your doctor may also prescribe medicine to help loosen your stool. Some medicines can cause constipation. These include pain medicines and antidepressants. Tell your doctor about all the medicines you take. Your doctor may want to make a medicine change to ease your symptoms. Follow-up care is a key part of your treatment and safety. Be sure to make and go to all appointments, and call your doctor if you are having problems. It's also a good idea to know your test results and keep a list of the medicines you take. How can you care for yourself at home? Drink plenty of fluids. If you have kidney, heart, or liver disease and have to limit fluids, talk with your doctor before you increase the amount of fluids you drink. Include high-fiber foods in your diet each day. These include fruits, vegetables, beans, and whole grains. Get at least 30 minutes of exercise on most days of the week. Walking is a good choice. You also may want to do other activities, such as running, swimming, cycling, or playing tennis or team sports. Take a fiber supplement, such as Citrucel or Metamucil, every day. Read and follow all instructions on the label. Schedule time each day for a bowel movement. A daily routine may help. Take your time having a bowel movement, but don't sit for more than 10 minutes at a time.  And don't strain too much.  Support your feet with a small step stool when you sit on the toilet. This helps flex your hips and places your pelvis in a squatting position. Your doctor may recommend an over-the-counter laxative to relieve your constipation. Examples are Milk of Magnesia and MiraLax. Read and follow all instructions on the label. Do not use laxatives on a long-term basis. When should you call for help? Call your doctor now or seek immediate medical care if:    You have new or worse belly pain. You have new or worse nausea or vomiting. You have blood in your stools. Watch closely for changes in your health, and be sure to contact your doctor if:    Your constipation is getting worse. You do not get better as expected. Where can you learn more? Go to http://www.hartley.com/  Enter P343 in the search box to learn more about \"Constipation: Care Instructions. \"  Current as of: July 1, 2021               Content Version: 13.2  © 2006-2022 [employer]wise, [employer]. Care instructions adapted under license by "[employer] (which disclaims liability or warranty for this information). If you have questions about a medical condition or this instruction, always ask your healthcare professional. [address] any warranty or liability for your use of this information.

## 2022-09-09 NOTE — PROGRESS NOTES
Patient is very anxious about having her CT scan. She keeps asking when it is going to be. I called CT and they do not have an approximate time due to a scanner being down. Patient said she might refuse the scan because she is hungry.
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
## 2022-09-09 NOTE — PROGRESS NOTES
Patient was complaining of leg pain in the right leg. Patient went on a walk to see if that would relieve the pain and went off the unit.  is aware of patient's pain in the leg and depression scale of 18. Order received and placed for psych consult and lower extremity duplex which will be done tomorrow. Hold discharge until then.

## 2022-09-09 NOTE — DISCHARGE SUMMARY
Name: Retia Paget MRN: [id number]  SSN: [id number]    YOB: 1995  Age: 27 y.o. Sex: female      Admit Date: 2022    Discharge Date: 2022     Admitting Physician: Maribel Cervantes MD     Attending Physician:  Kesha Doherty MD     Admission Diagnoses: Pregnancy [Z34.90]    Discharge Diagnoses:   Information for the patient's :  Chance Ramos [[id number]]   Delivery of a 5 lb 9.6 oz (2.54 kg) male infant via Vaginal, Spontaneous on 2022 at 8:18 AM  by Micha Dailey. Apgars were 7  and 8 . Additional Diagnoses:   Hospital Problems  Date Reviewed: 2022            Codes Class Noted POA    Abnormal glucose tolerance in pregnancy ICD-10-CM: O99.810  ICD-9-CM: 648.80  2022 Unknown        Pregnancy ICD-10-CM: Z34.90  ICD-9-CM: V22.2  2022 Unknown          Lab Results   Component Value Date/Time    ABO/Rh(D) Elisha Ee Positive 2022 04:55 AM       Immunization(s): There is no immunization history for the selected administration types on file for this patient. Hospital Course: Normal hospital course following the delivery. Patient Instructions:   Current Discharge Medication List        CONTINUE these medications which have NOT CHANGED    Details   prenatal vit-calcium-iron-fa (Prenatal Plus, calcium carb,) 27 mg iron- 1 mg tab Take 1 Tablet by mouth daily. pyridoxine, vitamin B6, (VITAMIN B-6) 50 mg tablet Take 1 Tablet by mouth daily. Indications: excessive vomiting in pregnancy  Qty: 90 Tablet, Refills: 0    Associated Diagnoses: Nausea and vomiting during pregnancy             Reference my discharge instructions. No orders of the defined types were placed in this encounter.        Signed By:  Micha Dailey MD     2022      \"[id number]\"     I spent 30 minutes or less with the patient to perform a final examination, discuss the hospital stay, give instructions for continuing care to all relevant caregivers and prepare discharge records, prescriptions and referral forms.

## 2022-09-10 ENCOUNTER — APPOINTMENT (OUTPATIENT)
Dept: NON INVASIVE DIAGNOSTICS | Age: 27
DRG: 560 | End: 2022-09-10
Attending: OBSTETRICS & GYNECOLOGY
Payer: MEDICAID

## 2022-09-10 VITALS
HEIGHT: 62 IN | TEMPERATURE: 98.7 F | RESPIRATION RATE: 18 BRPM | DIASTOLIC BLOOD PRESSURE: 74 MMHG | BODY MASS INDEX: 45.27 KG/M2 | HEART RATE: 79 BPM | WEIGHT: 246 LBS | OXYGEN SATURATION: 100 % | SYSTOLIC BLOOD PRESSURE: 126 MMHG

## 2022-09-10 PROCEDURE — 93970 EXTREMITY STUDY: CPT

## 2022-09-10 RX ORDER — SERTRALINE HYDROCHLORIDE 50 MG/1
50 TABLET, FILM COATED ORAL DAILY
Qty: 10 TABLET | Refills: 0 | Status: SHIPPED | OUTPATIENT
Start: 2022-09-10
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
## 2022-09-10 NOTE — PROGRESS NOTES
0850-Patient transported by pt transfer to have doppler on legs performed. Baby taken to nursery. 0930-Patient returned to room 308 from doppler study. Reports no needs or concerns at this time. 1051-Patient reports no needs or concerns at this time. 1156-Patient reports no needs or concerns at this time. 1301-Patient reports no needs or concerns at this time. 1400-Patient reports no needs or concerns at this time. 1508-Patient up in bathroom, reports no needs or concerns at this time. 1701-Baby blankets givento pt, reports no other needs or concerns at this time. 1810-Discharge instructions given to pt, prescriptions for percocet, motrin and zoloft sent to pt pharmacy. Pt instructed to call office on Monday to schedule 10 day followup appt with Dr Minerva Galvin for the depression. Pt verbalized understanding. Discharge plan of care/case management plan validated with provider discharge order.

## 2022-09-10 NOTE — PROGRESS NOTES
Pt wheeled to front entrance with  in car seat on lap. Belongings with patient. Patient's cousin pulled car to front entrance.

## 2022-09-10 NOTE — PROGRESS NOTES
Problem: Vaginal Delivery: Postpartum Day 1  Goal: Off Pathway (Use only if patient is Off Pathway)  Outcome: Progressing Towards Goal  Goal: Activity/Safety  Outcome: Progressing Towards Goal  Goal: Consults, if ordered  Outcome: Progressing Towards Goal  Goal: Diagnostic Test/Procedures  Outcome: Progressing Towards Goal  Goal: Nutrition/Diet  Outcome: Progressing Towards Goal  Goal: Discharge Planning  Outcome: Progressing Towards Goal  Goal: Medications  Outcome: Progressing Towards Goal  Goal: Treatments/Interventions/Procedures  Outcome: Progressing Towards Goal  Goal: Psychosocial  Outcome: Progressing Towards Goal  Goal: *Vital signs within defined limits  Outcome: Progressing Towards Goal  Goal: *Labs within defined limits  Outcome: Progressing Towards Goal  Goal: *Hemodynamically stable  Outcome: Progressing Towards Goal  Goal: *Optimal pain control at patient's stated goal  Outcome: Progressing Towards Goal  Goal: *Participates in infant care  Outcome: Progressing Towards Goal  Goal: *Demonstrates progressive activity  Outcome: Progressing Towards Goal  Goal: *Performs self perineal care  Outcome: Progressing Towards Goal  Goal: *Appropriate parent-infant bonding  Outcome: Progressing Towards Goal  Goal: *Tolerating diet  Outcome: Progressing Towards Goal  Goal: *Performs self breast care  Outcome: Progressing Towards Goal     Problem: Vaginal Delivery: Postpartum 2  Goal: Off Pathway (Use only if patient is Off Pathway)  Outcome: Progressing Towards Goal  Goal: Activity/Safety  Outcome: Progressing Towards Goal  Goal: Consults, if ordered  Outcome: Progressing Towards Goal  Goal: Nutrition/Diet  Outcome: Progressing Towards Goal  Goal: Discharge Planning  Outcome: Progressing Towards Goal  Goal: Medications  Outcome: Progressing Towards Goal  Goal: Treatments/Interventions/Procedures  Outcome: Progressing Towards Goal  Goal: Psychosocial  Outcome: Progressing Towards Goal     Problem: Vaginal Delivery: Discharge Outcomes  Goal: *Verbalizes name, dosage, time, side effects, and number of days to continue medications  Outcome: Progressing Towards Goal  Goal: *Describes available resources and support systems  Outcome: Progressing Towards Goal  Goal: *No signs and symptoms of infection  Outcome: Progressing Towards Goal  Goal: *Birth certificate information completed  Outcome: Progressing Towards Goal  Goal: *Received and verbalizes understanding of discharge plan and instructions  Outcome: Progressing Towards Goal  Goal: *Vital signs within defined limits  Outcome: Progressing Towards Goal  Goal: *Labs within defined limits  Outcome: Progressing Towards Goal  Goal: *Hemodynamically stable  Outcome: Progressing Towards Goal  Goal: *Optimal pain control at patient's stated goal  Outcome: Progressing Towards Goal  Goal: *Participates in infant care  Outcome: Progressing Towards Goal  Goal: *Demonstrates progressive activity  Outcome: Progressing Towards Goal  Goal: *Appropriate parent-infant bonding  Outcome: Progressing Towards Goal  Goal: *Tolerating diet  Outcome: Progressing Towards Goal     Problem: Falls - Risk of  Goal: *Absence of Falls  Description: Document John Fall Risk and appropriate interventions in the flowsheet.   Outcome: Progressing Towards Goal  Note: Fall Risk Interventions:            Medication Interventions: Teach patient to arise slowly                   Problem: Patient Education: Go to Patient Education Activity  Goal: Patient/Family Education  Outcome: Progressing Towards Goal

## 2022-09-10 NOTE — PROGRESS NOTES
Problem: Vaginal Delivery: Postpartum Day 1  Goal: Off Pathway (Use only if patient is Off Pathway)  9/10/2022 1821 by Saul Lin RN  Outcome: Resolved/Met  9/10/2022 0856 by Saul Lin RN  Outcome: Progressing Towards Goal  Goal: Activity/Safety  9/10/2022 1821 by Saul Lin RN  Outcome: Resolved/Met  9/10/2022 0856 by Saul Lin RN  Outcome: Progressing Towards Goal  Goal: Consults, if ordered  9/10/2022 1821 by Saul Lin RN  Outcome: Resolved/Met  9/10/2022 0856 by Saul Lin RN  Outcome: Progressing Towards Goal  Goal: Diagnostic Test/Procedures  9/10/2022 1821 by Saul Lin RN  Outcome: Resolved/Met  9/10/2022 0856 by Saul Lin RN  Outcome: Progressing Towards Goal  Goal: Nutrition/Diet  9/10/2022 1821 by Saul Lin RN  Outcome: Resolved/Met  9/10/2022 0856 by Saul Lin RN  Outcome: Progressing Towards Goal  Goal: Discharge Planning  9/10/2022 1821 by Saul Lin RN  Outcome: Resolved/Met  9/10/2022 0856 by Saul Lin RN  Outcome: Progressing Towards Goal  Goal: Medications  9/10/2022 1821 by Saul Lin RN  Outcome: Resolved/Met  9/10/2022 0856 by Saul Lin RN  Outcome: Progressing Towards Goal  Goal: Treatments/Interventions/Procedures  9/10/2022 1821 by Saul Lin RN  Outcome: Resolved/Met  9/10/2022 0856 by Saul Lin RN  Outcome: Progressing Towards Goal  Goal: Psychosocial  9/10/2022 1821 by Saul Lin RN  Outcome: Resolved/Met  9/10/2022 0856 by Saul Lin RN  Outcome: Progressing Towards Goal  Goal: *Vital signs within defined limits  9/10/2022 1821 by Saul Lin RN  Outcome: Resolved/Met  9/10/2022 0856 by Saul Lin RN  Outcome: Progressing Towards Goal  Goal: *Labs within defined limits  9/10/2022 1821 by Saul Lin RN  Outcome: Resolved/Met  9/10/2022 0856 by Saul Lin RN  Outcome: Progressing Towards Goal  Goal: *Hemodynamically stable  9/10/2022 1821 by Saul Lin RN  Outcome: Resolved/Met  9/10/2022 0856 by Kandace Salazar RN  Outcome: Progressing Towards Goal  Goal: *Optimal pain control at patient's stated goal  9/10/2022 1821 by Kandace Salazar RN  Outcome: Resolved/Met  9/10/2022 0856 by Kandace Salazar RN  Outcome: Progressing Towards Goal  Goal: *Participates in infant care  9/10/2022 1821 by Kandace Salazar RN  Outcome: Resolved/Met  9/10/2022 0856 by Kandace Salazar RN  Outcome: Progressing Towards Goal  Goal: *Demonstrates progressive activity  9/10/2022 1821 by Kandace Salazar RN  Outcome: Resolved/Met  9/10/2022 0856 by Kandace Salazar RN  Outcome: Progressing Towards Goal  Goal: *Performs self perineal care  9/10/2022 1821 by Kandace Salazar RN  Outcome: Resolved/Met  9/10/2022 0856 by Kandace Salazar RN  Outcome: Progressing Towards Goal  Goal: *Appropriate parent-infant bonding  9/10/2022 1821 by Kandace Salazar RN  Outcome: Resolved/Met  9/10/2022 0856 by Kandace Salazar RN  Outcome: Progressing Towards Goal  Goal: *Tolerating diet  9/10/2022 1821 by Kandace Salazar RN  Outcome: Resolved/Met  9/10/2022 0856 by Kandace Salazar RN  Outcome: Progressing Towards Goal  Goal: *Performs self breast care  9/10/2022 1821 by Kandace Salazar RN  Outcome: Resolved/Met  9/10/2022 0856 by Kandace Salazar RN  Outcome: Progressing Towards Goal     Problem: Vaginal Delivery: Postpartum 2  Goal: Off Pathway (Use only if patient is Off Pathway)  9/10/2022 1821 by Kandace Salazar RN  Outcome: Resolved/Met  9/10/2022 0856 by Kandace Salazar RN  Outcome: Progressing Towards Goal  Goal: Activity/Safety  9/10/2022 1821 by Kandace Salazar RN  Outcome: Resolved/Met  9/10/2022 0856 by Kandace Salazar RN  Outcome: Progressing Towards Goal  Goal: Consults, if ordered  9/10/2022 1821 by Kandace Salazar RN  Outcome: Resolved/Met  9/10/2022 0856 by Kandace Salazar RN  Outcome: Progressing Towards Goal  Goal: Nutrition/Diet  9/10/2022 1821 by Kandace Salazar RN  Outcome: Resolved/Met  9/10/2022 0856 by Pb Gonsales RN  Outcome: Progressing Towards Goal  Goal: Discharge Planning  9/10/2022 1821 by Pb Gonsales RN  Outcome: Resolved/Met  9/10/2022 0856 by Pb Gonsales RN  Outcome: Progressing Towards Goal  Goal: Medications  9/10/2022 1821 by Pb Gonsales RN  Outcome: Resolved/Met  9/10/2022 0856 by Pb Gonsales RN  Outcome: Progressing Towards Goal  Goal: Treatments/Interventions/Procedures  9/10/2022 1821 by Pb Gonsales RN  Outcome: Resolved/Met  9/10/2022 0856 by Pb Gonsales RN  Outcome: Progressing Towards Goal  Goal: Psychosocial  9/10/2022 1821 by Pb Gonsales RN  Outcome: Resolved/Met  9/10/2022 0856 by Pb Gonsales RN  Outcome: Progressing Towards Goal     Problem: Vaginal Delivery: Discharge Outcomes  Goal: *Verbalizes name, dosage, time, side effects, and number of days to continue medications  9/10/2022 1821 by Pb Gonsales RN  Outcome: Resolved/Met  9/10/2022 0856 by Pb Gonsales RN  Outcome: Progressing Towards Goal  Goal: *Describes available resources and support systems  9/10/2022 1821 by Pb Gonsales RN  Outcome: Resolved/Met  9/10/2022 0856 by Pb Gonsales RN  Outcome: Progressing Towards Goal  Goal: *No signs and symptoms of infection  9/10/2022 1821 by Pb Gonsales RN  Outcome: Resolved/Met  9/10/2022 0856 by Pb Gonsales RN  Outcome: Progressing Towards Goal  Goal: *Birth certificate information completed  9/10/2022 1821 by Pb Gonsales RN  Outcome: Resolved/Met  9/10/2022 0856 by Pb Gonsales RN  Outcome: Progressing Towards Goal  Goal: *Received and verbalizes understanding of discharge plan and instructions  9/10/2022 1821 by Pb Gonsales RN  Outcome: Resolved/Met  9/10/2022 0856 by Pb Gonsales RN  Outcome: Progressing Towards Goal  Goal: *Vital signs within defined limits  9/10/2022 1821 by Pb Gonsales RN  Outcome: Resolved/Met  9/10/2022 0856 by Pb Gonsales, DAVID  Outcome: Progressing Towards Goal  Goal: *Labs within defined limits  9/10/2022 1821 by Nyla Couch RN  Outcome: Resolved/Met  9/10/2022 0856 by Nyla Couch RN  Outcome: Progressing Towards Goal  Goal: *Hemodynamically stable  9/10/2022 1821 by Nyla Couch RN  Outcome: Resolved/Met  9/10/2022 0856 by Nyla Couch RN  Outcome: Progressing Towards Goal  Goal: *Optimal pain control at patient's stated goal  9/10/2022 1821 by Nyla Couch RN  Outcome: Resolved/Met  9/10/2022 0856 by Nyla Couch RN  Outcome: Progressing Towards Goal  Goal: *Participates in infant care  9/10/2022 1821 by Nyla Couch RN  Outcome: Resolved/Met  9/10/2022 0856 by Nyla Couch RN  Outcome: Progressing Towards Goal  Goal: *Demonstrates progressive activity  9/10/2022 1821 by Nyla Couch RN  Outcome: Resolved/Met  9/10/2022 0856 by Nyla Couch RN  Outcome: Progressing Towards Goal  Goal: *Appropriate parent-infant bonding  9/10/2022 1821 by Nyla Couch RN  Outcome: Resolved/Met  9/10/2022 0856 by Nyla Couch RN  Outcome: Progressing Towards Goal  Goal: *Tolerating diet  9/10/2022 1821 by Nyla Couch RN  Outcome: Resolved/Met  9/10/2022 0856 by Nyla Couch RN  Outcome: Progressing Towards Goal     Problem: Falls - Risk of  Goal: *Absence of Falls  Description: Document Starleen Freeze Fall Risk and appropriate interventions in the flowsheet.   9/10/2022 1821 by Nyla Couch RN  Outcome: Resolved/Met  Note: Fall Risk Interventions:            Medication Interventions: Teach patient to arise slowly                9/10/2022 0856 by Nyla Couch RN  Outcome: Progressing Towards Goal  Note: Fall Risk Interventions:            Medication Interventions: Teach patient to arise slowly                   Problem: Patient Education: Go to Patient Education Activity  Goal: Patient/Family Education  9/10/2022 1821 by Nyla Couch RN  Outcome: Resolved/Met  9/10/2022 0856 by Nyla Couch RN  Outcome: Progressing Towards Goal

## 2022-09-12 PROCEDURE — 93970 EXTREMITY STUDY: CPT | Performed by: SURGERY

## 2022-09-15 ENCOUNTER — HOSPITAL ENCOUNTER (EMERGENCY)
Age: 27
Discharge: HOME | End: 2022-09-15
Attending: EMERGENCY MEDICINE
Payer: MEDICAID

## 2022-09-15 VITALS
OXYGEN SATURATION: 97 % | RESPIRATION RATE: 18 BRPM | TEMPERATURE: 98.4 F | WEIGHT: 240 LBS | HEART RATE: 83 BPM | DIASTOLIC BLOOD PRESSURE: 75 MMHG | HEIGHT: 62 IN | SYSTOLIC BLOOD PRESSURE: 122 MMHG | BODY MASS INDEX: 44.16 KG/M2

## 2022-09-15 DIAGNOSIS — L05.01 PILONIDAL ABSCESS OF NATAL CLEFT: Primary | ICD-10-CM

## 2022-09-15 PROCEDURE — 99283 EMERGENCY DEPT VISIT LOW MDM: CPT

## 2022-09-15 PROCEDURE — 74011250637 HC RX REV CODE- 250/637: Performed by: PHYSICIAN ASSISTANT

## 2022-09-15 RX ORDER — METRONIDAZOLE 500 MG/1
500 TABLET ORAL 2 TIMES DAILY
Qty: 20 TABLET | Refills: 0 | Status: SHIPPED | OUTPATIENT
Start: 2022-09-15 | End: 2022-09-25

## 2022-09-15 RX ORDER — TRAMADOL HYDROCHLORIDE 50 MG/1
50 TABLET ORAL
Qty: 12 TABLET | Refills: 0 | Status: SHIPPED | OUTPATIENT
Start: 2022-09-15 | End: 2022-09-18

## 2022-09-15 RX ORDER — HYDROCODONE BITARTRATE AND ACETAMINOPHEN 5; 325 MG/1; MG/1
1 TABLET ORAL ONCE
Status: COMPLETED | OUTPATIENT
Start: 2022-09-15 | End: 2022-09-15

## 2022-09-15 RX ORDER — CIPROFLOXACIN 500 MG/1
500 TABLET ORAL 2 TIMES DAILY
Qty: 20 TABLET | Refills: 0 | Status: SHIPPED | OUTPATIENT
Start: 2022-09-15 | End: 2022-09-25

## 2022-09-15 RX ADMIN — HYDROCODONE BITARTRATE AND ACETAMINOPHEN 1 TABLET: 5; 325 TABLET ORAL at 20:56

## 2022-09-15 NOTE — Clinical Note
[facility] EMERGENCY DEPT  [address] [place] [id number] [phone]    Work/School Note    Date: 9/15/2022    To Whom It May concern:    Jenifer Christie was seen and treated today in the emergency room by the following provider(s):  Attending Provider: Ruthy Kaufman DO  Physician Assistant: Benedicto Clancy PA-C. Jenifer Christie is excused from work/school on 9/15/2022 through 9/17/2022. She is medically clear to return to work/school on 9/18/2022.          Sincerely,          Read Abelardo Bowling PA-C

## 2022-09-16 ENCOUNTER — TELEPHONE (OUTPATIENT)
Dept: SURGERY | Age: 27
End: 2022-09-16

## 2022-09-16 NOTE — TELEPHONE ENCOUNTER
Spoke with [facility] he said to add the patient on for this Wednesday. I called and spoke with Ms. Serrano and added her to the schedule. I told her to make sure she starts taking the antibiotics.

## 2022-09-16 NOTE — TELEPHONE ENCOUNTER
Ms Pete Reis called this morning to make her hospital follow up w/ Dr Marsha Thomas, I advised her his first available was 10/4, she stated the discharges papers say as soon as possible to follow up and she is in a lot of pain, shes a first time mom recently and this pain is interfering w/ taking care of her baby, she wanted to know if the nurse could take a look at Dr Abby Kimbrough schedule to fit her in sooner.  Please call when able [phone]

## 2022-09-16 NOTE — ED TRIAGE NOTES
Patient states she gave birth 7 days ago here and has a cyst between her buttocks that she noticed on the 10th. States it has increasingly become more painful.

## 2022-09-16 NOTE — ED PROVIDER NOTES
EMERGENCY DEPARTMENT HISTORY AND PHYSICAL EXAM      Date: 9/15/2022  Patient Name: Damaris Henderson    History of Presenting Illness     Chief Complaint   Patient presents with    Cyst     buttocks       History Provided By: Patient    HPI: Damaris Henderson, 27 y.o. female with a past medical history significant for anxiety presents to the ED with cc of skin problem. Patient presents with a 1 week history of tenderness and swelling to her gluteal cleft. Reports a history of prior pilonidal abscess notes current symptoms are consistent with previous. Patient notes previous episode spontaneously drained on its own. She denies any associated fevers, chills, night sweats, body aches. Denies treating symptoms with anything. Patient notes no modifying factors or additional associated symptoms. She reports otherwise being well and has no further concerns. There are no other complaints, changes, or physical findings at this time. PCP: Mansoor Coleman MD    No current facility-administered medications on file prior to encounter. Current Outpatient Medications on File Prior to Encounter   Medication Sig Dispense Refill    sertraline (ZOLOFT) 50 mg tablet Take 1 Tablet by mouth daily. 10 Tablet 0    ibuprofen (MOTRIN) 800 mg tablet Take 1 Tablet by mouth every eight (8) hours as needed for Pain. 30 Tablet 0    oxyCODONE-acetaminophen (PERCOCET) 5-325 mg per tablet Take 1 Tablet by mouth every four (4) hours as needed for Pain for up to 7 days. Max Daily Amount: 6 Tablets. 15 Tablet 0    [DISCONTINUED] prenatal vit-calcium-iron-fa (Prenatal Plus, calcium carb,) 27 mg iron- 1 mg tab Take 1 Tablet by mouth daily. pyridoxine, vitamin B6, (VITAMIN B-6) 50 mg tablet Take 1 Tablet by mouth daily.  Indications: excessive vomiting in pregnancy 90 Tablet 0       Past History     Past Medical History:  Past Medical History:   Diagnosis Date    Anxiety     Gestational diabetes     Gestational hypertension Past Surgical History:  No past surgical history on file. Family History:  No family history on file. Social History:  Social History     Tobacco Use    Smoking status: Never    Smokeless tobacco: Never   Vaping Use    Vaping Use: Never used   Substance Use Topics    Alcohol use: Not Currently    Drug use: Not Currently     Types: Marijuana       Allergies:  No Known Allergies    Review of Systems   Review of Systems   Constitutional: Negative. Negative for chills, diaphoresis and fever. HENT: Negative. Negative for congestion, rhinorrhea and sore throat. Eyes: Negative. Respiratory: Negative. Negative for cough, shortness of breath and wheezing. Cardiovascular: Negative. Negative for chest pain and palpitations. Gastrointestinal: Negative. Negative for abdominal pain, diarrhea, nausea and vomiting. Genitourinary: Negative. Negative for difficulty urinating, dysuria, flank pain, frequency and hematuria. Musculoskeletal: Negative. Negative for back pain and gait problem. Skin:  Positive for color change. Negative for rash. Neurological: Negative. Negative for dizziness, syncope, weakness, light-headedness, numbness and headaches. Psychiatric/Behavioral: Negative. All other systems reviewed and are negative. Physical Exam   Physical Exam  Vitals and nursing note reviewed. Exam conducted with a chaperone present. Constitutional:       General: She is not in acute distress. Appearance: Normal appearance. She is obese. She is not ill-appearing or toxic-appearing. HENT:      Head: Normocephalic and atraumatic. Mouth/Throat:      Mouth: Mucous membranes are moist.      Pharynx: Oropharynx is clear. Eyes:      Extraocular Movements: Extraocular movements intact. Conjunctiva/sclera: Conjunctivae normal.   Cardiovascular:      Rate and Rhythm: Regular rhythm. Tachycardia present. Pulses: Normal pulses. Heart sounds: No murmur heard.     No friction rub. No gallop. Pulmonary:      Effort: Pulmonary effort is normal.      Breath sounds: Normal breath sounds. No wheezing, rhonchi or rales. Abdominal:      General: There is no distension. Palpations: Abdomen is soft. Tenderness: There is no abdominal tenderness. There is no guarding or rebound. Genitourinary:     Comments: 3 x 3 cm area of swelling, tenderness, and induration to gluteal cleft consistent with pilonidal abscess. Minimal surrounding erythema. Musculoskeletal:      Cervical back: Neck supple. Skin:     General: Skin is warm and dry. Capillary Refill: Capillary refill takes less than 2 seconds. Findings: No rash. Neurological:      General: No focal deficit present. Mental Status: She is alert and oriented to person, place, and time. Psychiatric:         Mood and Affect: Mood normal.         Behavior: Behavior normal.       Lab and Diagnostic Study Results   Labs -   No results found for this or any previous visit (from the past 12 hour(s)). Radiologic Studies -   @lastxrresult@  CT Results  (Last 48 hours)      None          CXR Results  (Last 48 hours)      None            Medical Decision Making and ED Course   Differential Diagnosis & Medical Decision Making Provider Note:   Ddx: pilonidal abscess vs cyst, hidradenitis, perirectal abscess, cellulitis    - I am the first provider for this patient. I reviewed the vital signs, available nursing notes, past medical history, past surgical history, family history and social history. The patients presenting problems have been discussed, and they are in agreement with the care plan formulated and outlined with them. I have encouraged them to ask questions as they arise throughout their visit. Vital Signs-Reviewed the patient's vital signs.   Patient Vitals for the past 12 hrs:   Temp Pulse Resp BP SpO2   09/15/22 2213 -- 83 -- 122/75 97 %   09/15/22 2010 98.4 °F (36.9 °C) (!) 121 18 116/82 99 %       ED Course:   10:18 PM  Patient reassessed and updated on plan. She has been encouraged to schedule close follow-up with general surgery to schedule surgical excision of pilonidal abscess. Patient is also been encouraged to utilize sitz bath for symptomatic management. Patient states that her pain is well controlled this time. She has no other complaints or concerns. She is been educated on strict return precautions conveys good understanding agree with care plan as outlined. All of her questions have been answered and patient is ready be discharged home at this time. Procedures   Performed by: Margareth Orlando PA-C  Procedures      Disposition   Disposition: DC- Adult Discharges: All of the diagnostic tests were reviewed and questions answered. Diagnosis, care plan and treatment options were discussed. The patient understands the instructions and will follow up as directed. The patients results have been reviewed with them. They have been counseled regarding their diagnosis. The patient verbally convey understanding and agreement of the signs, symptoms, diagnosis, treatment and prognosis and additionally agrees to follow up as recommended with their PCP in 24 - 48 hours. They also agree with the care-plan and convey that all of their questions have been answered. I have also put together some discharge instructions for them that include: 1) educational information regarding their diagnosis, 2) how to care for their diagnosis at home, as well a 3) list of reasons why they would want to return to the ED prior to their follow-up appointment, should their condition change. DISCHARGE PLAN:  1. Current Discharge Medication List        CONTINUE these medications which have NOT CHANGED    Details   sertraline (ZOLOFT) 50 mg tablet Take 1 Tablet by mouth daily. Qty: 10 Tablet, Refills: 0      ibuprofen (MOTRIN) 800 mg tablet Take 1 Tablet by mouth every eight (8) hours as needed for Pain.   Qty: 30 Tablet, Refills: 0      oxyCODONE-acetaminophen (PERCOCET) 5-325 mg per tablet Take 1 Tablet by mouth every four (4) hours as needed for Pain for up to 7 days. Max Daily Amount: 6 Tablets. Qty: 15 Tablet, Refills: 0    Associated Diagnoses: Postoperative pain      prenatal vit-calcium-iron-fa (Prenatal Plus, calcium carb,) 27 mg iron- 1 mg tab Take 1 Tablet by mouth daily. pyridoxine, vitamin B6, (VITAMIN B-6) 50 mg tablet Take 1 Tablet by mouth daily. Indications: excessive vomiting in pregnancy  Qty: 90 Tablet, Refills: 0    Associated Diagnoses: Nausea and vomiting during pregnancy           2. Follow-up Information       Follow up With Specialties Details Why Contact Info    Sakina Walker MD Colon and Rectal Surgery Schedule an appointment as soon as possible for a visit   [address] [place] [address]  [phone]            3. Return to ED if worse   4. Current Discharge Medication List        START taking these medications    Details   metroNIDAZOLE (FlagyL) 500 mg tablet Take 1 Tablet by mouth two (2) times a day for 10 days. Qty: 20 Tablet, Refills: 0  Start date: 9/15/2022, End date: 2022      ciprofloxacin HCl (Cipro) 500 mg tablet Take 1 Tablet by mouth two (2) times a day for 10 days. Qty: 20 Tablet, Refills: 0  Start date: 9/15/2022, End date: 2022      traMADoL (Ultram) 50 mg tablet Take 1 Tablet by mouth every six (6) hours as needed for Pain for up to 3 days. Max Daily Amount: 200 mg. Qty: 12 Tablet, Refills: 0  Start date: 9/15/2022, End date: 2022    Associated Diagnoses: Pilonidal abscess of tran cleft           Remove if admitted/transferred    Diagnosis/Clinical Impression     Clinical Impression:   1. Pilonidal abscess of tran cleft        Attestations: Mireya MATTHEWS PA-C, am the primary clinician of record. Please note that this dictation was completed with [employer], the [employer] voice recognition software.   Quite often unanticipated grammatical, syntax, homophones, and other interpretive errors are inadvertently transcribed by the computer software. Please disregard these errors. Please excuse any errors that have escaped final proofreading. Thank you.

## 2022-09-21 ENCOUNTER — OFFICE VISIT (OUTPATIENT)
Dept: SURGERY | Age: 27
End: 2022-09-21
Payer: MEDICAID

## 2022-09-21 VITALS
OXYGEN SATURATION: 98 % | HEART RATE: 73 BPM | SYSTOLIC BLOOD PRESSURE: 123 MMHG | BODY MASS INDEX: 43.61 KG/M2 | RESPIRATION RATE: 20 BRPM | DIASTOLIC BLOOD PRESSURE: 67 MMHG | TEMPERATURE: 98 F | WEIGHT: 237 LBS | HEIGHT: 62 IN

## 2022-09-21 DIAGNOSIS — L05.01 PILONIDAL CYST WITH ABSCESS: ICD-10-CM

## 2022-09-21 PROCEDURE — 99203 OFFICE O/P NEW LOW 30 MIN: CPT | Performed by: COLON & RECTAL SURGERY

## 2022-09-21 RX ORDER — AMOXICILLIN AND CLAVULANATE POTASSIUM 875; 125 MG/1; MG/1
1 TABLET, FILM COATED ORAL EVERY 12 HOURS
Qty: 20 TABLET | Refills: 0 | Status: SHIPPED | OUTPATIENT
Start: 2022-09-21 | End: 2022-10-01

## 2022-12-13 ENCOUNTER — HOSPITAL ENCOUNTER (EMERGENCY)
Age: 27
Discharge: HOME | End: 2022-12-13
Payer: MEDICAID

## 2022-12-13 ENCOUNTER — APPOINTMENT (OUTPATIENT)
Dept: GENERAL RADIOLOGY | Age: 27
End: 2022-12-13
Attending: PHYSICIAN ASSISTANT
Payer: MEDICAID

## 2022-12-13 VITALS
WEIGHT: 225 LBS | DIASTOLIC BLOOD PRESSURE: 78 MMHG | HEART RATE: 79 BPM | RESPIRATION RATE: 18 BRPM | HEIGHT: 62 IN | SYSTOLIC BLOOD PRESSURE: 124 MMHG | BODY MASS INDEX: 41.41 KG/M2 | TEMPERATURE: 98.1 F | OXYGEN SATURATION: 100 %

## 2022-12-13 DIAGNOSIS — M25.812 OS ACROMIALE OF LEFT SHOULDER: ICD-10-CM

## 2022-12-13 DIAGNOSIS — M75.42 SUBACROMIAL IMPINGEMENT OF LEFT SHOULDER: Primary | ICD-10-CM

## 2022-12-13 DIAGNOSIS — S46.012A ROTATOR CUFF STRAIN, LEFT, INITIAL ENCOUNTER: ICD-10-CM

## 2022-12-13 LAB — HCG UR QL: NEGATIVE

## 2022-12-13 PROCEDURE — 81025 URINE PREGNANCY TEST: CPT

## 2022-12-13 PROCEDURE — 73030 X-RAY EXAM OF SHOULDER: CPT

## 2022-12-13 PROCEDURE — 74011250637 HC RX REV CODE- 250/637: Performed by: PHYSICIAN ASSISTANT

## 2022-12-13 PROCEDURE — 99283 EMERGENCY DEPT VISIT LOW MDM: CPT

## 2022-12-13 RX ORDER — IBUPROFEN 800 MG/1
800 TABLET ORAL
Qty: 30 TABLET | Refills: 0 | Status: SHIPPED | OUTPATIENT
Start: 2022-12-13

## 2022-12-13 RX ORDER — HYDROCODONE BITARTRATE AND ACETAMINOPHEN 5; 325 MG/1; MG/1
1 TABLET ORAL ONCE
Status: COMPLETED | OUTPATIENT
Start: 2022-12-13 | End: 2022-12-13

## 2022-12-13 RX ORDER — HYDROCODONE BITARTRATE AND ACETAMINOPHEN 5; 325 MG/1; MG/1
1 TABLET ORAL
Qty: 8 TABLET | Refills: 0 | Status: SHIPPED | OUTPATIENT
Start: 2022-12-13 | End: 2022-12-16

## 2022-12-13 RX ADMIN — HYDROCODONE BITARTRATE AND ACETAMINOPHEN 1 TABLET: 5; 325 TABLET ORAL at 16:57

## 2022-12-13 NOTE — ED PROVIDER NOTES
EMERGENCY DEPARTMENT HISTORY AND PHYSICAL EXAM      Date: 12/13/2022  Patient Name: Clint Hinkle    History of Presenting Illness     Chief Complaint   Patient presents with    Arm Pain       History Provided By: Patient    HPI: Clint Hinkle, 32 y.o. female with a past medical history significant for anxiety who presents to this ED with cc of left shoulder pain. Patient reports 3 to 4-day history of gradually worsening left shoulder pain. Denies any precipitating injury or trauma but does note that she has a 1month-old who she has been carrying without arm. Patient states her pain is exacerbated with overhead reaching and certain movement. Mildly relieved with rest.  States that she is tried treating with ibuprofen with temporary relief. Patient denies any associated numbness, weakness, neck pain or stiffness, headaches, lightheadedness, dizziness. Denies any chest pain or shortness of breath. Notes no additional alleviating or exacerbating factors. Denies any additional associated symptoms. Patient states that she is otherwise well has no further concerns. There are no other complaints, changes, or physical findings at this time. Past History     Past Medical History:  Past Medical History:   Diagnosis Date    Anxiety     Gestational diabetes     Gestational hypertension        Past Surgical History:  No past surgical history on file. Family History:  No family history on file. Social History:  Social History     Tobacco Use    Smoking status: Never    Smokeless tobacco: Never   Vaping Use    Vaping Use: Never used   Substance Use Topics    Alcohol use: Not Currently    Drug use: Not Currently     Types: Marijuana       Allergies:  No Known Allergies    PCP: Herrera Beal MD    No current facility-administered medications on file prior to encounter.      Current Outpatient Medications on File Prior to Encounter   Medication Sig Dispense Refill    sertraline (ZOLOFT) 50 mg tablet Take 1 Tablet by mouth daily. (Patient not taking: Reported on 9/21/2022) 10 Tablet 0    [DISCONTINUED] ibuprofen (MOTRIN) 800 mg tablet Take 1 Tablet by mouth every eight (8) hours as needed for Pain. 30 Tablet 0    pyridoxine, vitamin B6, (VITAMIN B-6) 50 mg tablet Take 1 Tablet by mouth daily. Indications: excessive vomiting in pregnancy (Patient not taking: Reported on 9/21/2022) 90 Tablet 0       Review of Systems   Review of Systems   Constitutional: Negative. Negative for chills, diaphoresis and fever. HENT: Negative. Negative for congestion, rhinorrhea and sore throat. Eyes: Negative. Respiratory: Negative. Negative for cough and shortness of breath. Cardiovascular: Negative. Negative for chest pain. Gastrointestinal: Negative. Negative for abdominal pain, diarrhea, nausea and vomiting. Genitourinary: Negative. Negative for difficulty urinating, dysuria and frequency. Musculoskeletal:  Positive for arthralgias and myalgias. Negative for back pain, gait problem and neck pain. Skin: Negative. Negative for rash. Neurological: Negative. Negative for dizziness, syncope, weakness, light-headedness, numbness and headaches. Psychiatric/Behavioral: Negative. All other systems reviewed and are negative. Physical Exam   Physical Exam  Vitals and nursing note reviewed. Constitutional:       General: She is not in acute distress. Appearance: Normal appearance. She is not ill-appearing or toxic-appearing. HENT:      Head: Normocephalic and atraumatic. Mouth/Throat:      Mouth: Mucous membranes are moist.      Pharynx: Oropharynx is clear. Eyes:      Extraocular Movements: Extraocular movements intact. Conjunctiva/sclera: Conjunctivae normal.      Pupils: Pupils are equal, round, and reactive to light. Cardiovascular:      Rate and Rhythm: Normal rate and regular rhythm. Pulses: Normal pulses. Heart sounds: No murmur heard. No friction rub.  No gallop. Pulmonary:      Effort: Pulmonary effort is normal.      Breath sounds: Normal breath sounds. No wheezing, rhonchi or rales. Abdominal:      General: There is no distension. Palpations: Abdomen is soft. Tenderness: There is no abdominal tenderness. There is no guarding or rebound. Musculoskeletal:      Left shoulder: Tenderness (anterior aspect) present. No swelling, deformity or bony tenderness. Normal range of motion. Cervical back: Normal and neck supple. No deformity, tenderness or bony tenderness. Comments: (+) Empty can on the left for rotator cuff arthropathy                         (+) Yi for subacromial impingement   Skin:     General: Skin is warm and dry. Capillary Refill: Capillary refill takes less than 2 seconds. Findings: No rash. Neurological:      General: No focal deficit present. Mental Status: She is alert and oriented to person, place, and time. Psychiatric:         Mood and Affect: Mood normal.         Behavior: Behavior normal.       Lab and Diagnostic Study Results   Labs -     Recent Results (from the past 12 hour(s))   POC HCG,URINE    Collection Time: 12/13/22  4:59 PM   Result Value Ref Range    Pregnancy test,urine (POC) Negative Negative         Radiologic Studies -   @lastxrresult@  CT Results  (Last 48 hours)      None          CXR Results  (Last 48 hours)      None            Medical Decision Making and ED Course   Differential Diagnosis & Medical Decision Making Provider Note:   Ddx: Sprain, strain, overuse injury, subacromial impingement, rotator cuff arthropathy, dislocation    Will assess with plain films, control pain, reassess, anticipate discharge with close outpatient orthopedic follow-up    - I am the first provider for this patient. I reviewed the vital signs, available nursing notes, past medical history, past surgical history, family history and social history.  The patients presenting problems have been discussed, and they are in agreement with the care plan formulated and outlined with them. I have encouraged them to ask questions as they arise throughout their visit. Vital Signs-Reviewed the patient's vital signs. Patient Vitals for the past 12 hrs:   Temp Pulse Resp BP SpO2   12/13/22 1631 -- -- -- -- 100 %   12/13/22 1630 -- -- -- -- 100 %   12/13/22 1626 98.1 °F (36.7 °C) 79 18 124/78 --          ALCOHOL/SUBSTANCE ABUSE COUNSELING: Upon evaluation, pt endorsed recent alcohol/illicit drug use. For approximately 15 minutes, pt has been counseled on the dangers of alcohol and illicit drug use on their health, and they were encouraged to quit as soon as possible in order to decrease further risks to their health. Pt has conveyed their understanding of the risks involved should they continue to use these products. Procedures   Performed by: Jolly Lagunas PA-C  Procedures      Disposition   Disposition: DC- Adult Discharges: All of the diagnostic tests were reviewed and questions answered. Diagnosis, care plan and treatment options were discussed. The patient understands the instructions and will follow up as directed. The patients results have been reviewed with them. They have been counseled regarding their diagnosis. The patient verbally convey understanding and agreement of the signs, symptoms, diagnosis, treatment and prognosis and additionally agrees to follow up as recommended with their PCP in 24 - 48 hours. They also agree with the care-plan and convey that all of their questions have been answered. I have also put together some discharge instructions for them that include: 1) educational information regarding their diagnosis, 2) how to care for their diagnosis at home, as well a 3) list of reasons why they would want to return to the ED prior to their follow-up appointment, should their condition change. DISCHARGE PLAN:  1.    Current Discharge Medication List        CONTINUE these medications which have NOT CHANGED    Details   sertraline (ZOLOFT) 50 mg tablet Take 1 Tablet by mouth daily. Qty: 10 Tablet, Refills: 0      ibuprofen (MOTRIN) 800 mg tablet Take 1 Tablet by mouth every eight (8) hours as needed for Pain. Qty: 30 Tablet, Refills: 0      pyridoxine, vitamin B6, (VITAMIN B-6) 50 mg tablet Take 1 Tablet by mouth daily. Indications: excessive vomiting in pregnancy  Qty: 90 Tablet, Refills: 0    Associated Diagnoses: Nausea and vomiting during pregnancy           2. Follow-up Information       Follow up With Specialties Details Why Contact Info    Mona Bird MD Orthopedic Surgery Schedule an appointment as soon as possible for a visit  As needed [facility] [address]  [facility] [address] [facility] [id number] [phone]      German Ocasio MD Family Medicine  As needed [address] [facility] [phone]      [facility] [facility] Emergency Medicine  If symptoms worsen [address] [place] [id number] [phone]          3. Return to ED if worse   4. Discharge Medication List as of 12/13/2022  5:59 PM        CONTINUE these medications which have CHANGED    Details   ibuprofen (MOTRIN) 800 mg tablet Take 1 Tablet by mouth every eight (8) hours as needed for Pain., Normal, Disp-30 Tablet, R-0           CONTINUE these medications which have NOT CHANGED    Details   sertraline (ZOLOFT) 50 mg tablet Take 1 Tablet by mouth daily. , Normal, Disp-10 Tablet, R-0      pyridoxine, vitamin B6, (VITAMIN B-6) 50 mg tablet Take 1 Tablet by mouth daily. Indications: excessive vomiting in pregnancy, Normal, Disp-90 Tablet, R-0           Remove if admitted/transferred    Diagnosis/Clinical Impression     Clinical Impression:   1. Subacromial impingement of left shoulder    2. Rotator cuff strain, left, initial encounter    3. Os acromiale of left shoulder        Attestations: Eleazar MATTHEWS PA-C, am the primary clinician of record.     Please note that this dictation was completed with [employer], the computer voice recognition software. Quite often unanticipated grammatical, syntax, homophones, and other interpretive errors are inadvertently transcribed by the computer software. Please disregard these errors. Please excuse any errors that have escaped final proofreading. Thank you. I was personally available for consultation in the emergency department. I have reviewed the chart and agree with the documentation recorded by the [facility], including the assessment, treatment plan, and disposition.   Margaret Katz MD

## 2022-12-13 NOTE — Clinical Note
[address]  [address] [place] [id number]  [phone]    Work/School Note    Date: 12/13/2022    To Whom It May concern:    Arnaldo Hoover was seen and treated today in the emergency room by the following provider(s):  Physician Assistant: Sherie Kaur PA-C. Arnaldo Hoover is excused from work/school on 12/13/22 and 12/14/22. She is medically clear to return to work/school on 12/15/2022.        Sincerely,          Eleazar Bowling PA-C

## 2022-12-13 NOTE — Clinical Note
[address]  [address] [place] [id number]  [phone]    Work/School Note    Date: 12/13/2022    To Whom It May concern:    Sy Stern was seen and treated today in the emergency room by the following provider(s):  Physician Assistant: Kely Silvestre PA-C. Sy Stern is excused from work/school on 12/13/22 and 12/14/22. She is medically clear to return to work/school on 12/15/2022.        Sincerely,          David Beatty RN

## 2023-01-06 NOTE — TELEPHONE ENCOUNTER
Returned the patient's call and she states she was seen at [facility] today for c/o rectal pain. States she was told it may be internal hemorrhoids and no rectal tear was seen. She was advised to contact Dr Valentín Morton as she is currently pregnant. She also states she was prescribed a stool softener. She states she has been constipated. She was also advised she may try Preparation H suppositories. Advised her I would try to get her records from the visit today but Dr Valentín Morton doesn't handle rectal pain.
Statement Selected

## 2023-05-03 PROBLEM — L05.01 PILONIDAL CYST WITH ABSCESS: Status: ACTIVE | Noted: 2023-05-03

## 2023-05-25 RX ORDER — PYRIDOXINE HCL (VITAMIN B6) 50 MG
50 TABLET ORAL DAILY
COMMUNITY
Start: 2022-02-17

## 2023-05-25 RX ORDER — IBUPROFEN 800 MG/1
800 TABLET ORAL EVERY 8 HOURS PRN
COMMUNITY
Start: 2022-12-13